# Patient Record
Sex: MALE | Race: BLACK OR AFRICAN AMERICAN | Employment: FULL TIME | URBAN - METROPOLITAN AREA
[De-identification: names, ages, dates, MRNs, and addresses within clinical notes are randomized per-mention and may not be internally consistent; named-entity substitution may affect disease eponyms.]

---
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
## 2020-03-09 ENCOUNTER — ED HISTORICAL/CONVERTED ENCOUNTER (OUTPATIENT)
Dept: OTHER | Age: 20
End: 2020-03-09

## 2020-06-03 ENCOUNTER — ED HISTORICAL/CONVERTED ENCOUNTER (OUTPATIENT)
Dept: OTHER | Age: 20
End: 2020-06-03

## 2020-10-01 ENCOUNTER — APPOINTMENT (OUTPATIENT)
Dept: ULTRASOUND IMAGING | Age: 20
End: 2020-10-01
Attending: PHYSICIAN ASSISTANT
Payer: MEDICAID

## 2020-10-01 ENCOUNTER — HOSPITAL ENCOUNTER (EMERGENCY)
Age: 20
Discharge: HOME | End: 2020-10-01
Payer: MEDICAID

## 2020-10-01 VITALS
OXYGEN SATURATION: 97 % | HEIGHT: 71 IN | TEMPERATURE: 98.9 F | DIASTOLIC BLOOD PRESSURE: 75 MMHG | SYSTOLIC BLOOD PRESSURE: 122 MMHG | WEIGHT: 260 LBS | HEART RATE: 66 BPM | BODY MASS INDEX: 36.4 KG/M2 | RESPIRATION RATE: 18 BRPM

## 2020-10-01 DIAGNOSIS — S39.011A STRAIN OF ABDOMINAL MUSCLE, INITIAL ENCOUNTER: Primary | ICD-10-CM

## 2020-10-01 LAB
APPEARANCE UR: CLEAR
BACTERIA URNS QL MICRO: NEGATIVE /HPF
BILIRUB UR QL: NEGATIVE
COLOR UR: ABNORMAL
GLUCOSE UR STRIP.AUTO-MCNC: NEGATIVE MG/DL
HGB UR QL STRIP: NEGATIVE
KETONES UR QL STRIP.AUTO: NEGATIVE MG/DL
LEUKOCYTE ESTERASE UR QL STRIP.AUTO: NEGATIVE
MUCOUS THREADS URNS QL MICRO: ABNORMAL /LPF
NITRITE UR QL STRIP.AUTO: NEGATIVE
PH UR STRIP: 6 [PH] (ref 5–8)
PROT UR STRIP-MCNC: NEGATIVE MG/DL
RBC #/AREA URNS HPF: ABNORMAL /HPF (ref 0–5)
SP GR UR REFRACTOMETRY: 1.03 (ref 1–1.03)
UROBILINOGEN UR QL STRIP.AUTO: 2 EU/DL (ref 0.1–1)
WBC URNS QL MICRO: ABNORMAL /HPF (ref 0–4)

## 2020-10-01 PROCEDURE — 76870 US EXAM SCROTUM: CPT

## 2020-10-01 PROCEDURE — 99283 EMERGENCY DEPT VISIT LOW MDM: CPT

## 2020-10-01 PROCEDURE — 81001 URINALYSIS AUTO W/SCOPE: CPT

## 2020-10-01 RX ORDER — CYCLOBENZAPRINE HCL 5 MG
5 TABLET ORAL
Qty: 15 TAB | Refills: 0 | Status: SHIPPED | OUTPATIENT
Start: 2020-10-01 | End: 2020-10-06

## 2020-10-01 RX ORDER — NAPROXEN 500 MG/1
500 TABLET ORAL 2 TIMES DAILY WITH MEALS
Qty: 20 TAB | Refills: 0 | Status: SHIPPED | OUTPATIENT
Start: 2020-10-01 | End: 2020-10-11

## 2020-10-01 NOTE — LETTER
NOTIFICATION RETURN TO WORK / SCHOOL 
 
10/1/2020 12:07 PM 
 
Mr. Estela Reyna [address] [facility] [id number] To Whom It May Concern: 
 
Estela Reyna is currently under the care of [facility] EMERGENCY DEPT. He will return to work/school on: 10/3/2020 Estela Reyna may return to work/school with the following restrictions: {Work activity restriction:77899}. If there are questions or concerns please have the patient contact our office. Sincerely, Yang Anderson PA-C

## 2020-10-01 NOTE — LETTER
Eileen Ward was seen and treated in our emergency department on 10/1/2020. He may return to work on 10/3/2020. If you have any questions or concerns, please don't hesitate to call.  
 
 
Navid oPnce PA-C

## 2020-10-01 NOTE — ED PROVIDER NOTES
EMERGENCY DEPARTMENT HISTORY AND PHYSICAL EXAM      Date: 10/1/2020  Patient Name: Devin Pinto    History of Presenting Illness     Chief Complaint   Patient presents with    Abdominal Pain       History Provided By: Patient    HPI: Devin Pinto, 21 y.o. male with No significant past medical history presents to the ED with cc of intermittent, sharp, 7/10 lower abdominal pain radiating down towards his scrotal area x4 days. Symptoms exacerbated with certain movements. No particular alleviating factors as pain just resolves on its own. He began going to the gym and doing more ab workouts recently but wanted to make sure there there was no  abnormality. Patient does not take any medication on a daily basis. Denies tobacco, call, illicit drug use. Patient denies fever, chills, chest pain, shortness of breath, nausea, vomiting, diarrhea, urinary symptoms. No concern for STI at this time. There are no other complaints, changes, or physical findings at this time. PCP: No primary care provider on file. No current facility-administered medications on file prior to encounter. No current outpatient medications on file prior to encounter. Past History     Past Medical History:  No past medical history on file. Past Surgical History:  No past surgical history on file. Family History:  No family history on file. Social History:  Social History     Tobacco Use    Smoking status: Not on file   Substance Use Topics    Alcohol use: Not on file    Drug use: Not on file       Allergies:  No Known Allergies      Review of Systems     Review of Systems   Constitutional: Negative for appetite change, chills, diaphoresis, fatigue, fever and unexpected weight change. HENT: Negative. Respiratory: Negative for shortness of breath. Cardiovascular: Negative for chest pain. Gastrointestinal: Positive for abdominal pain (lower).  Negative for abdominal distention, anal bleeding, blood in stool, constipation, diarrhea, nausea and vomiting. Denies hematemesis   Genitourinary: Positive for testicular pain. Negative for discharge, flank pain, frequency, hematuria, penile pain, penile swelling, scrotal swelling and urgency. Musculoskeletal: Negative for back pain. Skin: Negative for rash. Neurological: Negative for dizziness, weakness and light-headedness. Psychiatric/Behavioral: Negative. All other systems reviewed and are negative. Physical Exam     Physical Exam  Vitals signs and nursing note reviewed. Constitutional:       General: He is not in acute distress. Appearance: Normal appearance. He is well-developed. He is not ill-appearing, toxic-appearing or diaphoretic. Comments: AA male sitting in chair, able to transfer from chair to stretcher without pain   HENT:      Head: Normocephalic and atraumatic. Nose: Nose normal. No congestion or rhinorrhea. Mouth/Throat:      Mouth: Mucous membranes are moist.      Pharynx: Oropharynx is clear. No oropharyngeal exudate or posterior oropharyngeal erythema. Eyes:      General: No scleral icterus. Conjunctiva/sclera: Conjunctivae normal.      Pupils: Pupils are equal, round, and reactive to light. Neck:      Musculoskeletal: Normal range of motion and neck supple. No neck rigidity or muscular tenderness. Cardiovascular:      Rate and Rhythm: Normal rate and regular rhythm. Pulses:           Radial pulses are 2+ on the right side and 2+ on the left side. Dorsalis pedis pulses are 2+ on the right side and 2+ on the left side. Heart sounds: No murmur. No friction rub. No gallop. Pulmonary:      Effort: Pulmonary effort is normal. No tachypnea, accessory muscle usage, respiratory distress or retractions. Breath sounds: Normal breath sounds. No stridor. No decreased breath sounds, wheezing, rhonchi or rales. Chest:      Chest wall: No tenderness.    Abdominal:      General: Bowel sounds are normal. There is no distension. Palpations: Abdomen is soft. There is no mass. Tenderness: There is no abdominal tenderness. There is no right CVA tenderness, left CVA tenderness, guarding or rebound. Musculoskeletal: Normal range of motion. General: No deformity. Right lower leg: No edema. Left lower leg: No edema. Skin:     General: Skin is warm and dry. Capillary Refill: Capillary refill takes less than 2 seconds. Coloration: Skin is not jaundiced or pale. Findings: No bruising, erythema or rash. Neurological:      General: No focal deficit present. Mental Status: He is alert and oriented to person, place, and time. Mental status is at baseline. Sensory: Sensation is intact. Motor: Motor function is intact. Psychiatric:         Mood and Affect: Mood normal.         Behavior: Behavior normal. Behavior is cooperative. Thought Content: Thought content normal.         Judgment: Judgment normal.         Diagnostic Study Results     Labs -     Recent Results (from the past 12 hour(s))   URINALYSIS W/MICROSCOPIC    Collection Time: 10/01/20  9:25 AM   Result Value Ref Range    Color Yellow/Straw      Appearance Clear Clear      Specific gravity 1.028 1.003 - 1.030      pH (UA) 6.0 5.0 - 8.0      Protein Negative Negative mg/dL    Glucose Negative Negative mg/dL    Ketone Negative Negative mg/dL    Bilirubin Negative Negative      Blood Negative Negative      Urobilinogen 2.0 (H) 0.1 - 1.0 EU/dL    Nitrites Negative Negative      Leukocyte Esterase Negative Negative      WBC 0-4 0 - 4 /hpf    RBC 0-5 0 - 5 /hpf    Bacteria Negative Negative /hpf    Mucus 3+ /lpf       Radiologic Studies -   Study Result     Testicular ultrasound, 10/1/2020     History: Scrotal, suprapubic discomfort.     Comparison: None.     Findings: The right testicle measures 3.2 cm x 4.4 cm x 2.0 cm. The right  testicle has normal echotexture.   No mass is identified. The right epididymal  head measures 0.8 cm and has no focal abnormality.       The left testicle measures 2.9 cm x 3.7 cm x 1.7 cm. The left testicle has  normal echotexture. No mass is identified. The left epididymal head measures  0.6 and has no focal abnormality.     Color flow imaging shows blood flow in both testicles.       There is no gross evidence of inguinal hernia.     Few inguinal lymph nodes with normal morphology measuring up to 0.6 cm x 0.8 cm  x 1.2 cm are noted.     IMPRESSION  Impression: No testicular mass or acute process identified.         Imaging     US SCROTUM/TESTICLES (Order: [id number]) - 10/1/2020       Medical Decision Making   I am the first provider for this patient. I reviewed the vital signs, available nursing notes, past medical history, past surgical history, family history and social history. Vital Signs-Reviewed the patient's vital signs. Patient Vitals for the past 12 hrs:   Temp Pulse Resp BP SpO2   10/01/20 0909 98.9 °F (37.2 °C) 66 18 122/75 97 %       Records Reviewed: Nursing Notes and Old Medical Records    The patient presents with abdominal pain with a differential diagnosis of muscle strain, torsion, UTI      Provider Notes (Medical Decision Making):     MDM  Number of Diagnoses or Management Options  Diagnosis management comments:     26-year-old with lower abdominal discomfort radiating to bilateral testes. History of increase in exercise regimen but concerned about possible  component. UA unremarkable. Ultrasound of the scrotum/testes was also unremarkable. Will treat patient with anti-inflammatory and muscle relaxer for symptomatic relief. Amount and/or Complexity of Data Reviewed  Clinical lab tests: ordered and reviewed  Tests in the radiology section of CPT®: ordered and reviewed  Review and summarize past medical records: yes    Patient Progress  Patient progress: stable           ED Course:   Initial assessment performed.  The patients presenting problems have been discussed, and they are in agreement with the care plan formulated and outlined with them. I have encouraged them to ask questions as they arise throughout their visit. PLAN:  1. Current Discharge Medication List      START taking these medications    Details   naproxen (Naprosyn) 500 mg tablet Take 1 Tab by mouth two (2) times daily (with meals) for 10 days. Qty: 20 Tab, Refills: 0      cyclobenzaprine (FLEXERIL) 5 mg tablet Take 1 Tab by mouth three (3) times daily as needed for Muscle Spasm(s) for up to 5 days. Qty: 15 Tab, Refills: 0           2. Follow-up Information     Follow up With Specialties Details Why [address] [facility] EMERGENCY DEPT Emergency Medicine  As needed, If symptoms worsen [address] [facility] [address] [phone]    Your Primary Care Provider  In 3 days As needed         Return to ED if worse     Diagnosis     Clinical Impression:   1.  Strain of abdominal muscle, initial encounter

## 2021-01-26 ENCOUNTER — HOSPITAL ENCOUNTER (EMERGENCY)
Age: 21
Discharge: LEFT BEFORE DISCHARGE | End: 2021-01-26
Attending: EMERGENCY MEDICINE
Payer: MEDICAID

## 2021-01-26 VITALS
WEIGHT: 260 LBS | DIASTOLIC BLOOD PRESSURE: 72 MMHG | HEART RATE: 67 BPM | BODY MASS INDEX: 36.4 KG/M2 | SYSTOLIC BLOOD PRESSURE: 124 MMHG | HEIGHT: 71 IN | OXYGEN SATURATION: 100 % | RESPIRATION RATE: 16 BRPM

## 2021-01-26 PROCEDURE — 75810000275 HC EMERGENCY DEPT VISIT NO LEVEL OF CARE

## 2021-01-27 ENCOUNTER — HOSPITAL ENCOUNTER (EMERGENCY)
Age: 21
Discharge: HOME | End: 2021-01-27
Attending: FAMILY MEDICINE
Payer: MEDICAID

## 2021-01-27 ENCOUNTER — APPOINTMENT (OUTPATIENT)
Dept: CT IMAGING | Age: 21
End: 2021-01-27
Attending: FAMILY MEDICINE
Payer: MEDICAID

## 2021-01-27 VITALS
TEMPERATURE: 98 F | HEART RATE: 80 BPM | BODY MASS INDEX: 36.4 KG/M2 | OXYGEN SATURATION: 99 % | WEIGHT: 260 LBS | RESPIRATION RATE: 20 BRPM | HEIGHT: 71 IN | SYSTOLIC BLOOD PRESSURE: 122 MMHG | DIASTOLIC BLOOD PRESSURE: 80 MMHG

## 2021-01-27 DIAGNOSIS — I88.0 MESENTERIC ADENITIS: Primary | ICD-10-CM

## 2021-01-27 LAB
ALBUMIN SERPL-MCNC: 4 G/DL (ref 3.5–5)
ALBUMIN/GLOB SERPL: 1 {RATIO} (ref 1.1–2.2)
ALP SERPL-CCNC: 68 U/L (ref 45–117)
ALT SERPL-CCNC: 32 U/L (ref 12–78)
ANION GAP SERPL CALC-SCNC: 9 MMOL/L (ref 5–15)
APPEARANCE UR: CLEAR
AST SERPL W P-5'-P-CCNC: 26 U/L (ref 15–37)
BASOPHILS # BLD: 0 K/UL (ref 0–0.1)
BASOPHILS NFR BLD: 0 % (ref 0–1)
BILIRUB SERPL-MCNC: 0.3 MG/DL (ref 0.2–1)
BILIRUB UR QL: NEGATIVE
BUN SERPL-MCNC: 9 MG/DL (ref 6–20)
BUN/CREAT SERPL: 11 (ref 12–20)
CA-I BLD-MCNC: 9.9 MG/DL (ref 8.5–10.1)
CHLORIDE SERPL-SCNC: 104 MMOL/L (ref 97–108)
CO2 SERPL-SCNC: 28 MMOL/L (ref 21–32)
COLOR UR: YELLOW
CREAT SERPL-MCNC: 0.79 MG/DL (ref 0.7–1.3)
DIFFERENTIAL METHOD BLD: NORMAL
EOSINOPHIL # BLD: 0.1 K/UL (ref 0–0.4)
EOSINOPHIL NFR BLD: 1 % (ref 0–7)
ERYTHROCYTE [DISTWIDTH] IN BLOOD BY AUTOMATED COUNT: 12.2 % (ref 11.5–14.5)
GLOBULIN SER CALC-MCNC: 4.1 G/DL (ref 2–4)
GLUCOSE SERPL-MCNC: 97 MG/DL (ref 65–100)
GLUCOSE UR STRIP.AUTO-MCNC: NEGATIVE MG/DL
HCT VFR BLD AUTO: 42.8 % (ref 36.6–50.3)
HGB BLD-MCNC: 14.1 G/DL (ref 12.1–17)
HGB UR QL STRIP: NEGATIVE
IMM GRANULOCYTES # BLD AUTO: 0 K/UL (ref 0–0.04)
IMM GRANULOCYTES NFR BLD AUTO: 0 % (ref 0–0.5)
KETONES UR QL STRIP.AUTO: NEGATIVE MG/DL
LACTATE SERPL-SCNC: 1.2 MMOL/L (ref 0.4–2)
LEUKOCYTE ESTERASE UR QL STRIP.AUTO: NEGATIVE
LIPASE SERPL-CCNC: 52 U/L (ref 73–393)
LYMPHOCYTES # BLD: 2.5 K/UL (ref 0.8–3.5)
LYMPHOCYTES NFR BLD: 33 % (ref 12–49)
MCH RBC QN AUTO: 31 PG (ref 26–34)
MCHC RBC AUTO-ENTMCNC: 32.9 G/DL (ref 30–36.5)
MCV RBC AUTO: 94.1 FL (ref 80–99)
MONOCYTES # BLD: 0.6 K/UL (ref 0–1)
MONOCYTES NFR BLD: 8 % (ref 5–13)
NEUTS SEG # BLD: 4.5 K/UL (ref 1.8–8)
NEUTS SEG NFR BLD: 58 % (ref 32–75)
NITRITE UR QL STRIP.AUTO: NEGATIVE
PH UR STRIP: 7 [PH] (ref 5–8)
PLATELET # BLD AUTO: 215 K/UL (ref 150–400)
PMV BLD AUTO: 11.9 FL (ref 8.9–12.9)
POTASSIUM SERPL-SCNC: 3.8 MMOL/L (ref 3.5–5.1)
PROT SERPL-MCNC: 8.1 G/DL (ref 6.4–8.2)
PROT UR STRIP-MCNC: NEGATIVE MG/DL
RBC # BLD AUTO: 4.55 M/UL (ref 4.1–5.7)
SODIUM SERPL-SCNC: 141 MMOL/L (ref 136–145)
SP GR UR REFRACTOMETRY: 1.01 (ref 1–1.03)
UROBILINOGEN UR QL STRIP.AUTO: 0.1 EU/DL (ref 0.2–1)
WBC # BLD AUTO: 7.8 K/UL (ref 4.1–11.1)

## 2021-01-27 PROCEDURE — 83690 ASSAY OF LIPASE: CPT

## 2021-01-27 PROCEDURE — 36415 COLL VENOUS BLD VENIPUNCTURE: CPT

## 2021-01-27 PROCEDURE — 81003 URINALYSIS AUTO W/O SCOPE: CPT

## 2021-01-27 PROCEDURE — 99283 EMERGENCY DEPT VISIT LOW MDM: CPT

## 2021-01-27 PROCEDURE — 80053 COMPREHEN METABOLIC PANEL: CPT

## 2021-01-27 PROCEDURE — 74011000636 HC RX REV CODE- 636: Performed by: FAMILY MEDICINE

## 2021-01-27 PROCEDURE — 74177 CT ABD & PELVIS W/CONTRAST: CPT

## 2021-01-27 PROCEDURE — 85025 COMPLETE CBC W/AUTO DIFF WBC: CPT

## 2021-01-27 PROCEDURE — 83605 ASSAY OF LACTIC ACID: CPT

## 2021-01-27 RX ADMIN — IOPAMIDOL 100 ML: 755 INJECTION, SOLUTION INTRAVENOUS at 12:14

## 2021-01-27 NOTE — Clinical Note
[address] [place] [address] 
[phone] Work/School Note Date: 1/27/2021 To Whom It May concern: 
 
 
Kimmie Jenkins was seen and treated today in the emergency room by the following provider(s): 
Attending Provider: Mabel Felty, DO. Kimmie Jenkins is excused from work/school on 01/27/21. He is clear to return to work/school on 01/28/21. Sincerely, Alok Segovia DO

## 2021-01-27 NOTE — ED PROVIDER NOTES
EMERGENCY DEPARTMENT HISTORY AND PHYSICAL EXAM      Date: 1/27/2021  Patient Name: Paz Hylton    History of Presenting Illness     Chief Complaint   Patient presents with    Diarrhea    Nausea    Abdominal Pain       History Provided By:     HPI: Paz Hylton, is an extremely pleasant 21 y.o. male presenting to the ED with cc of abdominal pain. He states is been going on for several days. Does not seem to be getting better nor worse. It comes and goes. He is also been feeling nauseous and achy. He has never had nothing like this before. He had to call in from work because he did not feel like he could perform his duties. He has been nauseous but has not vomited. No fever nor chills. Decreased appetite but drinking well. No bloody nor black stools, but mild diarrhea. He denies sick contacts no recent travel. There are no other complaints, changes, or physical findings at this time. PCP: Beto Browne MD    No current facility-administered medications on file prior to encounter. No current outpatient medications on file prior to encounter. Past History     Past Medical History:  History reviewed. No pertinent past medical history. Past Surgical History:  History reviewed. No pertinent surgical history. Family History:  History reviewed. No pertinent family history. Social History:  Social History     Tobacco Use    Smoking status: Never Smoker    Smokeless tobacco: Never Used   Substance Use Topics    Alcohol use: Never     Frequency: Never    Drug use: Never       Allergies:  No Known Allergies      Review of Systems     Review of Systems   Constitutional: Positive for activity change and appetite change. Negative for chills, fatigue and fever. HENT: Negative for congestion and sore throat. Eyes: Negative for photophobia and visual disturbance. Respiratory: Negative for cough, shortness of breath and wheezing.     Cardiovascular: Negative for chest pain, palpitations and leg swelling. Gastrointestinal: Positive for abdominal pain, diarrhea and nausea. Negative for vomiting. Endocrine: Negative for cold intolerance and heat intolerance. Genitourinary: Positive for dysuria and frequency. Musculoskeletal: Negative for gait problem and joint swelling. Skin: Negative for color change and rash. Neurological: Negative for dizziness and headaches. Physical Exam     Physical Exam  Constitutional:       Appearance: Normal appearance. HENT:      Head: Normocephalic and atraumatic. Mouth/Throat:      Mouth: Mucous membranes are moist.      Pharynx: Oropharynx is clear. Eyes:      Extraocular Movements: Extraocular movements intact. Conjunctiva/sclera: Conjunctivae normal.   Neck:      Musculoskeletal: Normal range of motion and neck supple. Cardiovascular:      Rate and Rhythm: Normal rate and regular rhythm. Heart sounds: Normal heart sounds. Pulmonary:      Effort: Pulmonary effort is normal. No respiratory distress. Breath sounds: Normal breath sounds. No wheezing, rhonchi or rales. Abdominal:      General: There is no distension. Palpations: Abdomen is soft. Tenderness: There is no abdominal tenderness. There is no guarding. Comments: Mild right lower quadrant abdominal pain      No rebound, guarding. No right upper quadrant tenderness. Musculoskeletal:         General: No deformity. Left lower leg: No edema. Skin:     Findings: No erythema or rash. Neurological:      General: No focal deficit present. Mental Status: He is alert and oriented to person, place, and time.       Gait: Gait normal.   Psychiatric:         Mood and Affect: Mood normal.         Behavior: Behavior normal.         Lab and Diagnostic Study Results     Labs -     Recent Results (from the past 12 hour(s))   CBC WITH AUTOMATED DIFF    Collection Time: 01/27/21 10:15 AM   Result Value Ref Range    WBC 7.8 4.1 - 11.1 K/uL    RBC 4. 55 4.10 - 5.70 M/uL    HGB 14.1 12.1 - 17.0 g/dL    HCT 42.8 36.6 - 50.3 %    MCV 94.1 80.0 - 99.0 FL    MCH 31.0 26.0 - 34.0 PG    MCHC 32.9 30.0 - 36.5 g/dL    RDW 12.2 11.5 - 14.5 %    PLATELET 060 817 - 046 K/uL    MPV 11.9 8.9 - 12.9 FL    NEUTROPHILS 58 32 - 75 %    LYMPHOCYTES 33 12 - 49 %    MONOCYTES 8 5 - 13 %    EOSINOPHILS 1 0 - 7 %    BASOPHILS 0 0 - 1 %    IMMATURE GRANULOCYTES 0 0.0 - 0.5 %    ABS. NEUTROPHILS 4.5 1.8 - 8.0 K/UL    ABS. LYMPHOCYTES 2.5 0.8 - 3.5 K/UL    ABS. MONOCYTES 0.6 0.0 - 1.0 K/UL    ABS. EOSINOPHILS 0.1 0.0 - 0.4 K/UL    ABS. BASOPHILS 0.0 0.0 - 0.1 K/UL    ABS. IMM. GRANS. 0.0 0.00 - 0.04 K/UL    DF AUTOMATED     METABOLIC PANEL, COMPREHENSIVE    Collection Time: 01/27/21 10:15 AM   Result Value Ref Range    Sodium 141 136 - 145 mmol/L    Potassium 3.8 3.5 - 5.1 mmol/L    Chloride 104 97 - 108 mmol/L    CO2 28 21 - 32 mmol/L    Anion gap 9 5 - 15 mmol/L    Glucose 97 65 - 100 mg/dL    BUN 9 6 - 20 mg/dL    Creatinine 0.79 0.70 - 1.30 mg/dL    BUN/Creatinine ratio 11 (L) 12 - 20      GFR est AA >60 >60 ml/min/1.73m2    GFR est non-AA >60 >60 ml/min/1.73m2    Calcium 9.9 8.5 - 10.1 mg/dL    Bilirubin, total 0.3 0.2 - 1.0 mg/dL    AST (SGOT) 26 15 - 37 U/L    ALT (SGPT) 32 12 - 78 U/L    Alk.  phosphatase 68 45 - 117 U/L    Protein, total 8.1 6.4 - 8.2 g/dL    Albumin 4.0 3.5 - 5.0 g/dL    Globulin 4.1 (H) 2.0 - 4.0 g/dL    A-G Ratio 1.0 (L) 1.1 - 2.2     LIPASE    Collection Time: 01/27/21 10:15 AM   Result Value Ref Range    Lipase 52 (L) 73 - 393 U/L   LACTIC ACID    Collection Time: 01/27/21 10:15 AM   Result Value Ref Range    Lactic acid 1.2 0.4 - 2.0 mmol/L   URINALYSIS W/ RFLX MICROSCOPIC    Collection Time: 01/27/21 10:15 AM   Result Value Ref Range    Color Yellow      Appearance Clear Clear      Specific gravity 1.010 1.003 - 1.030      pH (UA) 7.0 5.0 - 8.0      Protein Negative Negative mg/dL    Glucose Negative Negative mg/dL    Ketone Negative Negative mg/dL Bilirubin Negative Negative      Blood Negative Negative      Urobilinogen 0.1 (L) 0.2 - 1.0 EU/dL    Nitrites Negative Negative      Leukocyte Esterase Negative Negative         Radiologic Studies -   @lastxrresult@  CT Results  (Last 48 hours)               01/27/21 1216  CT ABD PELV W CONT Final result    Impression:  Impression: Few relatively prominent mesenteric lymph nodes in the right lower   quadrant, nonspecific. Mesenteric adenitis is a consideration. Other findings   as above. Narrative:  CT abdomen and pelvis, 1/27/2021       History: Right lower quadrant pain. Nausea. Technique: Oral contrast was not given. Multiple contiguous axial images were   obtained from the diaphragm to the inferior pubic rami following intravenous   administration of 100 mL Isovue 370 contrast material.  Coronal and sagittal   reconstruction of images was made. All CT scans at this facility are performed using dose reduction optimization   techniques as appropriate to perform the exam including the following: Automated   exposure control, adjustments of the mA and/or kV according to patient size, or   use iterative reconstruction technique. Comparison:  None. Findings: The included lung bases are clear. The liver, gallbladder, spleen, pancreas, adrenal glands and kidneys demonstrate   no focal abnormalities. There is a small fat-containing umbilical hernia measuring 2.7 cm in greatest   dimension. The stomach has grossly normal contours. There is no evidence of mechanical   bowel obstruction. The terminal ileum is within normal limits. The appendix   images normally. There is no specific CT evidence of colitis. There are a few relatively prominent mesenteric lymph nodes in the right lower   quadrant measuring up to 7 mm in short axis. These are nonspecific in etiology. Mesenteric adenitis is a consideration. The bladder is partially distended.   No focal bladder wall thickening or   intraluminal calculus is seen. The prostate gland and seminal vesicles are   within normal limits. No inguinal hernia is identified. The abdominal aorta is normal in caliber. No free air or fluid is noted. The lumbar spine and bony pelvis are intact. CXR Results  (Last 48 hours)    None            Medical Decision Making   - I am the first provider for this patient. - I reviewed the vital signs, available nursing notes, past medical history, past surgical history, family history and social history. - Initial assessment performed. The patients presenting problems have been discussed, and they are in agreement with the care plan formulated and outlined with them. I have encouraged them to ask questions as they arise throughout their visit. Vital Signs-Reviewed the patient's vital signs. Patient Vitals for the past 12 hrs:   Temp Pulse Resp BP SpO2   01/27/21 1155 -- 80 20 122/80 99 %   01/27/21 0932 98 °F (36.7 °C) 75 18 129/84 98 %         ED Course/ Provider Notes (Medical Decision Making):     Patient presented to the emergency with a chief complaint of abdominal pain, nausea and diarrhea. Vital signs unremarkable per above. CBC, CMP, lipase and lactic acid all unremarkable. CT scan with IV contrast demonstrated: Impression: Few relatively prominent mesenteric lymph nodes in the right lower  quadrant, nonspecific. Mesenteric adenitis is a consideration. Other findings  as above. Patient did not require any medications for his symptoms as he was improving. History and exam findings consistent with the CT findings demonstrating possible mesenteric adenitis. We discussed this diagnosis as well as supportive measures to take    Froylan Mora was given a thorough list of signs and symptoms that would warrant an immediate return to the emergency department. Otherwise Froylan Mora will follow up with PCP.        Procedures   Medical Decision Makingedical Decision Making  Performed by: Sonal Ball DO  Procedures  None       Disposition   Disposition:     All of the diagnostic tests were reviewed and questions answered. Diagnosis, care plan and treatment options were discussed. The patient understands the instructions and will follow up as directed. The patients results have been reviewed with them. They have been counseled regarding their diagnosis. The patient verbally convey understanding and agreement of the signs, symptoms, diagnosis, treatment and prognosis and additionally agrees to follow up as recommended with their PCP in 24 - 48 hours. They also agree with the care-plan and convey that all of their questions have been answered. I have also put together some discharge instructions for them that include: 1) educational information regarding their diagnosis, 2) how to care for their diagnosis at home, as well a 3) list of reasons why they would want to return to the ED prior to their follow-up appointment, should their condition change. Home     DISCHARGE PLAN:    1. There are no discharge medications for this patient. 2.   Follow-up Information     Follow up With Specialties Details Why Contact Info    Franny Glover MD Pediatric Medicine Schedule an appointment as soon as possible for a visit in 1 day  [address]      Franny Glover MD Pediatric Medicine Schedule an appointment as soon as possible for a visit   [address]              3.  Return to ED if worse       4. There are no discharge medications for this patient. Diagnosis     Clinical Impression:    1. Mesenteric adenitis        Attestations:    Sonal Ball DO    Please note that this dictation was completed with [email or web address], the computer voice recognition software.   Quite often unanticipated grammatical, syntax, homophones, and other interpretive errors are inadvertently transcribed by the computer software. Please disregard these errors. Please excuse any errors that have escaped final proofreading. Thank you.

## 2021-01-27 NOTE — LETTER
[address] [address] [place] [address] 
[phone] Work/School Note Date: 1/27/2021 To Whom It May concern: 
 
Brad Nina was seen and treated today in the emergency room by the following provider(s): 
Attending Provider: Xavier Banegas DO. Brad Nina  May return 1/31/2021 Sincerely, Rubina Mujica RN

## 2021-01-27 NOTE — ED TRIAGE NOTES
\" I ate some hamburgers and stuff from [employer] night and about 15 minutes after I started with some nausea, fatigue, and abdominal cramping and then I started having diarrhea about 3 hours later \"

## 2021-02-03 ENCOUNTER — HOSPITAL ENCOUNTER (EMERGENCY)
Age: 21
Discharge: HOME | End: 2021-02-03
Attending: FAMILY MEDICINE
Payer: MEDICAID

## 2021-02-03 VITALS
WEIGHT: 260 LBS | TEMPERATURE: 99.1 F | RESPIRATION RATE: 16 BRPM | HEIGHT: 71 IN | DIASTOLIC BLOOD PRESSURE: 82 MMHG | BODY MASS INDEX: 36.4 KG/M2 | SYSTOLIC BLOOD PRESSURE: 128 MMHG | HEART RATE: 86 BPM

## 2021-02-03 DIAGNOSIS — F41.0 PANIC ATTACKS: Primary | ICD-10-CM

## 2021-02-03 PROCEDURE — 99282 EMERGENCY DEPT VISIT SF MDM: CPT

## 2021-02-03 RX ORDER — HYDROXYZINE 50 MG/1
50 TABLET, FILM COATED ORAL
Qty: 30 TAB | Refills: 0 | Status: SHIPPED | OUTPATIENT
Start: 2021-02-03 | End: 2021-02-13

## 2021-02-03 NOTE — LETTER
[address] [place] [address] 
[phone] Work/School Note Date: 2/3/2021 To Whom It May concern: 
 
Kimmie Jenkins was seen and treated today in the emergency room by the following provider(s): 
Attending Provider: Mabel Felty, DO. Kimmie Jenkins may return to work 02/04/2021 Dr. Nav Temple

## 2021-02-03 NOTE — ED PROVIDER NOTES
EMERGENCY DEPARTMENT HISTORY AND PHYSICAL EXAM      Date: 2/3/2021  Patient Name: Eva Lopez    History of Presenting Illness     Chief Complaint   Patient presents with    Anxiety       History Provided By:     HPI: Eva Lopez, is an extremely pleasant 21 y.o. male presenting to the ED with a chief complaint of anxiety. He states this has been a challenge for him for a very long time. He states most recently he has been having panic attacks. He states he was recently in a cab when he started to have rapid breathing, tingling in his extremities and profound anxiety. He ended up calling 911. He was reassured after being examined and did not go to the hospital.  He states he is concerned because he is not sure when his next panic attack will happen. He states he currently made an appointment with a new PCP however his appointment is not until May. Aside from the rapid breathing, tingling in his extremities and profound anxiety he denies any other concerns. There is been no chest pain. No nausea vomiting or diarrhea. He is eating and drinking well. He denies other concerns. There are no other complaints, changes, or physical findings at this time. PCP: Dale Seymour MD    No current facility-administered medications on file prior to encounter. No current outpatient medications on file prior to encounter. Past History     Past Medical History:  History reviewed. No pertinent past medical history. Past Surgical History:  History reviewed. No pertinent surgical history. Family History:  History reviewed. No pertinent family history.     Social History:  Social History     Tobacco Use    Smoking status: Never Smoker    Smokeless tobacco: Never Used   Substance Use Topics    Alcohol use: Never     Frequency: Never    Drug use: Never       Allergies:  No Known Allergies      Review of Systems     Review of Systems   Constitutional: Negative for activity change, appetite change, chills, fatigue and fever. HENT: Negative for congestion and sore throat. Eyes: Negative for photophobia and visual disturbance. Respiratory: Negative for cough, shortness of breath and wheezing. Cardiovascular: Negative for chest pain, palpitations and leg swelling. Gastrointestinal: Negative for abdominal pain, diarrhea, nausea and vomiting. Endocrine: Negative for cold intolerance and heat intolerance. Musculoskeletal: Negative for gait problem and joint swelling. Skin: Negative for color change and rash. Neurological: Negative for dizziness and headaches. Psychiatric/Behavioral:        Anxiety, denies suicidal ideation       Physical Exam     Physical Exam  Constitutional:       Appearance: Normal appearance. HENT:      Head: Normocephalic and atraumatic. Mouth/Throat:      Mouth: Mucous membranes are moist.      Pharynx: Oropharynx is clear. Eyes:      Extraocular Movements: Extraocular movements intact. Conjunctiva/sclera: Conjunctivae normal.   Neck:      Musculoskeletal: Normal range of motion and neck supple. Cardiovascular:      Rate and Rhythm: Normal rate and regular rhythm. Heart sounds: Normal heart sounds. Pulmonary:      Effort: Pulmonary effort is normal. No respiratory distress. Breath sounds: Normal breath sounds. No wheezing, rhonchi or rales. Abdominal:      General: There is no distension. Palpations: Abdomen is soft. Tenderness: There is no abdominal tenderness. There is no guarding. Musculoskeletal:         General: No deformity. Right lower leg: No edema. Left lower leg: No edema. Skin:     Findings: No erythema or rash. Neurological:      General: No focal deficit present. Mental Status: He is alert and oriented to person, place, and time. Gait: Gait normal.   Psychiatric:         Attention and Perception: Attention and perception normal.         Mood and Affect: Mood is anxious.          Speech: Speech normal.         Behavior: Behavior normal. Behavior is cooperative. Thought Content: Thought content normal.         Cognition and Memory: Cognition normal.         Judgment: Judgment normal.      Comments: Incredibly pleasant         Lab and Diagnostic Study Results     Labs -   No results found for this or any previous visit (from the past 12 hour(s)). Radiologic Studies -   @lastxrresult@  CT Results  (Last 48 hours)    None        CXR Results  (Last 48 hours)    None            Medical Decision Making   - I am the first provider for this patient. - I reviewed the vital signs, available nursing notes, past medical history, past surgical history, family history and social history. - Initial assessment performed. The patients presenting problems have been discussed, and they are in agreement with the care plan formulated and outlined with them. I have encouraged them to ask questions as they arise throughout their visit. Vital Signs-Reviewed the patient's vital signs. No data found. ED Course/ Provider Notes (Medical Decision Making):     Patient presented to the emergency department with a chief complaint of anxiety and panic attacks. We had a very long discussion regarding managing panic attacks. I prescribed hydroxyzine and gave him a handout on panic attacks. He is going to follow-up with his PCP. Rajan Paredes was given a thorough list of signs and symptoms that would warrant an immediate return to the emergency department. Otherwise Rajan Paredes will follow up with PCP. Procedures   Medical Decision Makingedical Decision Making  Performed by: April Aguirre DO  Procedures  None       Disposition   Disposition:     Home    All of the diagnostic tests were reviewed and questions answered. Diagnosis, care plan and treatment options were discussed. The patient understands the instructions and will follow up as directed.  The patients results have been reviewed with them. They have been counseled regarding their diagnosis. The patient verbally convey understanding and agreement of the signs, symptoms, diagnosis, treatment and prognosis and additionally agrees to follow up as recommended with their PCP in 24 - 48 hours. They also agree with the care-plan and convey that all of their questions have been answered. I have also put together some discharge instructions for them that include: 1) educational information regarding their diagnosis, 2) how to care for their diagnosis at home, as well a 3) list of reasons why they would want to return to the ED prior to their follow-up appointment, should their condition change. DISCHARGE PLAN:    1. Current Discharge Medication List      START taking these medications    Details   hydrOXYzine HCL (ATARAX) 50 mg tablet Take 1 Tab by mouth three (3) times daily as needed for Anxiety for up to 10 days. Qty: 30 Tab, Refills: 0               2.   Follow-up Information     Follow up With Specialties Details Why Contact Info    Bren Callejas MD Pediatric Medicine Schedule an appointment as soon as possible for a visit   [address]  [id number]              3.  Return to ED if worse       4. Discharge Medication List as of 2/3/2021 10:33 AM            Diagnosis     Clinical Impression:    1. Panic attacks        Attestations:    Lisa Fulton, DO    Please note that this dictation was completed with [employer], the computer voice recognition software. Quite often unanticipated grammatical, syntax, homophones, and other interpretive errors are inadvertently transcribed by the computer software. Please disregard these errors. Please excuse any errors that have escaped final proofreading. Thank you.

## 2021-02-03 NOTE — ED TRIAGE NOTES
Third ER visit in last three days, dx here with \"mesenteric andenitis\", concerned that \"something serious is wrong with me\", [facility] yesterday

## 2021-02-10 ENCOUNTER — HOSPITAL ENCOUNTER (EMERGENCY)
Age: 21
Discharge: HOME | End: 2021-02-10
Payer: MEDICAID

## 2021-02-10 VITALS
WEIGHT: 250 LBS | OXYGEN SATURATION: 99 % | RESPIRATION RATE: 20 BRPM | BODY MASS INDEX: 35 KG/M2 | DIASTOLIC BLOOD PRESSURE: 80 MMHG | SYSTOLIC BLOOD PRESSURE: 138 MMHG | HEART RATE: 99 BPM | TEMPERATURE: 98.8 F | HEIGHT: 71 IN

## 2021-02-10 DIAGNOSIS — H66.90 ACUTE OTITIS MEDIA, UNSPECIFIED OTITIS MEDIA TYPE: Primary | ICD-10-CM

## 2021-02-10 PROCEDURE — 99282 EMERGENCY DEPT VISIT SF MDM: CPT

## 2021-02-10 RX ORDER — AMOXICILLIN 875 MG/1
875 TABLET, FILM COATED ORAL 2 TIMES DAILY
Qty: 10 TAB | Refills: 0 | Status: SHIPPED | OUTPATIENT
Start: 2021-02-10 | End: 2021-02-15

## 2021-02-10 RX ORDER — OMEPRAZOLE 20 MG/1
CAPSULE, DELAYED RELEASE ORAL
COMMUNITY
Start: 2021-02-02 | End: 2021-03-01

## 2021-02-10 RX ORDER — FAMOTIDINE 20 MG/1
TABLET, FILM COATED ORAL
COMMUNITY
Start: 2021-02-02 | End: 2021-02-10 | Stop reason: SDUPTHER

## 2021-02-10 NOTE — Clinical Note
[address] [place] [address] 
[phone] Work/School Note Date: 2/10/2021 To Whom It May concern: 
 
 
Renetta Zurita was seen and treated today in the emergency room by the following provider(s): 
No providers found. Renetta Zurita is excused from work/school on 02/10/21. He is clear to return to work/school on 02/11/21. Sincerely, Lyman Seip, DO

## 2021-02-10 NOTE — ED TRIAGE NOTES
Pt has multiple chronic C/O that \"only happen when I'm trying to fall asleep\" stating these symptoms have persisted \"for years\"; states he was seen by PCP 3 days ago and \"told everything looked good, he told me I'm healthy\". Only new c/o is L tragus discomfort x2 days.

## 2021-02-11 NOTE — ED PROVIDER NOTES
EMERGENCY DEPARTMENT HISTORY AND PHYSICAL EXAM      Date: 2/10/2021  Patient Name: Neda Jain    History of Presenting Illness     Chief Complaint   Patient presents with    Ear Pain       History Provided By:     HPI: Neda Jain, is an extremely pleasant 21 y.o. male presenting to the ED with a chief complaint of left ear pain. He states it started 2 to 3 days ago. He states the pain is waxing and waning but is overall very uncomfortable. He states that its causing a mild headache on the left side. He denies any fevers, chills or rigors. He has not had any drainage from the ear. He is eating and drinking well. He denies difficulty hearing. It does not hurt if he touches the outside of his ear. He does not have a history of recurrent ear infections. He has not take anything for his pain. There are no other complaints, changes, or physical findings at this time. PCP: Regan Flynn MD    No current facility-administered medications on file prior to encounter. Current Outpatient Medications on File Prior to Encounter   Medication Sig Dispense Refill    omeprazole (PRILOSEC) 20 mg capsule TAKE 1 CAPSULE IN THE MORNING      [DISCONTINUED] famotidine (PEPCID) 20 mg tablet TAKE 1 TABLET BY MOUTH TWICE A DAY      hydrOXYzine HCL (ATARAX) 50 mg tablet Take 1 Tab by mouth three (3) times daily as needed for Anxiety for up to 10 days. 30 Tab 0       Past History     Past Medical History:  Past Medical History:   Diagnosis Date    Anxiety     GERD (gastroesophageal reflux disease)        Past Surgical History:  History reviewed. No pertinent surgical history. Family History:  History reviewed. No pertinent family history.     Social History:  Social History     Tobacco Use    Smoking status: Never Smoker    Smokeless tobacco: Never Used   Substance Use Topics    Alcohol use: Never     Frequency: Never    Drug use: Never       Allergies:  No Known Allergies      Review of Systems Review of Systems   Constitutional: Negative for activity change, appetite change, chills, fatigue and fever. HENT: Positive for ear pain. Negative for congestion, ear discharge, hearing loss and sore throat. Eyes: Negative for photophobia and visual disturbance. Respiratory: Negative for cough, shortness of breath and wheezing. Cardiovascular: Negative for chest pain, palpitations and leg swelling. Gastrointestinal: Negative for abdominal pain, diarrhea, nausea and vomiting. Endocrine: Negative for cold intolerance and heat intolerance. Musculoskeletal: Negative for gait problem and joint swelling. Skin: Negative for color change and rash. Neurological: Negative for dizziness and headaches. Physical Exam     Physical Exam  Constitutional:       Appearance: Normal appearance. HENT:      Head: Normocephalic and atraumatic. Comments: No pain over the left mastoid process with palpation     Right Ear: Tympanic membrane, ear canal and external ear normal.      Ears:      Comments: Tympanic membrane on the left is slightly erythematous, clear fluid behind the TM, slightly bulging     Mouth/Throat:      Mouth: Mucous membranes are moist.      Pharynx: Oropharynx is clear. Eyes:      Extraocular Movements: Extraocular movements intact. Conjunctiva/sclera: Conjunctivae normal.   Neck:      Musculoskeletal: Normal range of motion and neck supple. Cardiovascular:      Rate and Rhythm: Normal rate and regular rhythm. Heart sounds: Normal heart sounds. Pulmonary:      Effort: Pulmonary effort is normal. No respiratory distress. Breath sounds: Normal breath sounds. No wheezing, rhonchi or rales. Abdominal:      General: There is no distension. Palpations: Abdomen is soft. Tenderness: There is no abdominal tenderness. There is no guarding. Musculoskeletal:         General: No deformity. Right lower leg: No edema. Left lower leg: No edema.    Skin: Findings: No erythema or rash. Neurological:      General: No focal deficit present. Mental Status: He is alert and oriented to person, place, and time. Gait: Gait normal.   Psychiatric:         Mood and Affect: Mood normal.         Behavior: Behavior normal.         Lab and Diagnostic Study Results     Labs -   No results found for this or any previous visit (from the past 12 hour(s)). Radiologic Studies -   @lastxrresult@  CT Results  (Last 48 hours)    None        CXR Results  (Last 48 hours)    None            Medical Decision Making   - I am the first provider for this patient. - I reviewed the vital signs, available nursing notes, past medical history, past surgical history, family history and social history. - Initial assessment performed. The patients presenting problems have been discussed, and they are in agreement with the care plan formulated and outlined with them. I have encouraged them to ask questions as they arise throughout their visit. Vital Signs-Reviewed the patient's vital signs. Patient Vitals for the past 12 hrs:   Temp Pulse Resp BP SpO2   02/10/21 1827 98.8 °F (37.1 °C) 99 20 138/80 99 %         ED Course/ Provider Notes (Medical Decision Making):     Patient presented to the emergency department with a chief complaint of left ear pain. Symptoms and exam findings consistent with otitis media. He was treated with amoxicillin. He will follow up with his PCP. Saba Nelson was given a thorough list of signs and symptoms that would warrant an immediate return to the emergency department. Otherwise Saba Nelson will follow up with PCP.        Procedures   Medical Decision Makingedical Decision Making  Performed by: Shirlene Slater DO  Procedures  None       Disposition   Disposition:     Home    The patient verbally convey understanding and agreement of the signs, symptoms, diagnosis, treatment and prognosis and additionally agrees to follow up as recommended with their PCP in 24 - 48 hours. They also agree with the care-plan and convey that all of their questions have been answered. I have also put together some discharge instructions for them that include: 1) educational information regarding their diagnosis, 2) how to care for their diagnosis at home, as well a 3) list of reasons why they would want to return to the ED prior to their follow-up appointment, should their condition change. DISCHARGE PLAN:    1. Current Discharge Medication List      START taking these medications    Details   amoxicillin (AMOXIL) 875 mg tablet Take 1 Tab by mouth two (2) times a day for 5 days. Qty: 10 Tab, Refills: 0         CONTINUE these medications which have NOT CHANGED    Details   omeprazole (PRILOSEC) 20 mg capsule TAKE 1 CAPSULE IN THE MORNING      hydrOXYzine HCL (ATARAX) 50 mg tablet Take 1 Tab by mouth three (3) times daily as needed for Anxiety for up to 10 days. Qty: 30 Tab, Refills: 0         STOP taking these medications       famotidine (PEPCID) 20 mg tablet Comments:   Reason for Stoppin.   Follow-up Information    None           3. Return to ED if worse       4. Current Discharge Medication List      START taking these medications    Details   amoxicillin (AMOXIL) 875 mg tablet Take 1 Tab by mouth two (2) times a day for 5 days. Qty: 10 Tab, Refills: 0               Diagnosis     Clinical Impression:    1. Acute otitis media, unspecified otitis media type        Attestations:    Lester Siemens, DO    Please note that this dictation was completed with [employer], the computer voice recognition software. Quite often unanticipated grammatical, syntax, homophones, and other interpretive errors are inadvertently transcribed by the computer software. Please disregard these errors. Please excuse any errors that have escaped final proofreading. Thank you.

## 2021-02-23 ENCOUNTER — HOSPITAL ENCOUNTER (EMERGENCY)
Age: 21
Discharge: HOME | End: 2021-02-23
Attending: EMERGENCY MEDICINE
Payer: MEDICAID

## 2021-02-23 ENCOUNTER — APPOINTMENT (OUTPATIENT)
Dept: GENERAL RADIOLOGY | Age: 21
End: 2021-02-23
Attending: EMERGENCY MEDICINE
Payer: MEDICAID

## 2021-02-23 VITALS
TEMPERATURE: 98.5 F | RESPIRATION RATE: 16 BRPM | HEART RATE: 85 BPM | DIASTOLIC BLOOD PRESSURE: 72 MMHG | WEIGHT: 260 LBS | OXYGEN SATURATION: 98 % | SYSTOLIC BLOOD PRESSURE: 139 MMHG | BODY MASS INDEX: 35.21 KG/M2 | HEIGHT: 72 IN

## 2021-02-23 DIAGNOSIS — R07.89 ATYPICAL CHEST PAIN: Primary | ICD-10-CM

## 2021-02-23 DIAGNOSIS — F41.0 PANIC ATTACKS: ICD-10-CM

## 2021-02-23 LAB
ANION GAP SERPL CALC-SCNC: 5 MMOL/L (ref 5–15)
ATRIAL RATE: 88 BPM
BASOPHILS # BLD: 0 K/UL (ref 0–0.1)
BASOPHILS NFR BLD: 0 % (ref 0–1)
BUN SERPL-MCNC: 12 MG/DL (ref 6–20)
BUN/CREAT SERPL: 13 (ref 12–20)
CA-I BLD-MCNC: 9.7 MG/DL (ref 8.5–10.1)
CALCULATED P AXIS, ECG09: 79 DEGREES
CALCULATED R AXIS, ECG10: 82 DEGREES
CALCULATED T AXIS, ECG11: -87 DEGREES
CHLORIDE SERPL-SCNC: 106 MMOL/L (ref 97–108)
CO2 SERPL-SCNC: 29 MMOL/L (ref 21–32)
CREAT SERPL-MCNC: 0.94 MG/DL (ref 0.7–1.3)
DIAGNOSIS, 93000: NORMAL
DIFFERENTIAL METHOD BLD: NORMAL
EOSINOPHIL # BLD: 0.1 K/UL (ref 0–0.4)
EOSINOPHIL NFR BLD: 1 % (ref 0–7)
ERYTHROCYTE [DISTWIDTH] IN BLOOD BY AUTOMATED COUNT: 13 % (ref 11.5–14.5)
GLUCOSE SERPL-MCNC: 92 MG/DL (ref 65–100)
HCT VFR BLD AUTO: 42.7 % (ref 36.6–50.3)
HGB BLD-MCNC: 14.1 G/DL (ref 12.1–17)
IMM GRANULOCYTES # BLD AUTO: 0 K/UL (ref 0–0.04)
IMM GRANULOCYTES NFR BLD AUTO: 0 % (ref 0–0.5)
LYMPHOCYTES # BLD: 2.4 K/UL (ref 0.8–3.5)
LYMPHOCYTES NFR BLD: 28 % (ref 12–49)
MCH RBC QN AUTO: 31.3 PG (ref 26–34)
MCHC RBC AUTO-ENTMCNC: 33 G/DL (ref 30–36.5)
MCV RBC AUTO: 94.7 FL (ref 80–99)
MONOCYTES # BLD: 0.6 K/UL (ref 0–1)
MONOCYTES NFR BLD: 7 % (ref 5–13)
NEUTS SEG # BLD: 5.6 K/UL (ref 1.8–8)
NEUTS SEG NFR BLD: 64 % (ref 32–75)
P-R INTERVAL, ECG05: 152 MS
PLATELET # BLD AUTO: 209 K/UL (ref 150–400)
PMV BLD AUTO: 12.8 FL (ref 8.9–12.9)
POTASSIUM SERPL-SCNC: 3.8 MMOL/L (ref 3.5–5.1)
Q-T INTERVAL, ECG07: 368 MS
QRS DURATION, ECG06: 92 MS
QTC CALCULATION (BEZET), ECG08: 445 MS
RBC # BLD AUTO: 4.51 M/UL (ref 4.1–5.7)
SODIUM SERPL-SCNC: 140 MMOL/L (ref 136–145)
TROPONIN I SERPL-MCNC: <0.05 NG/ML
VENTRICULAR RATE, ECG03: 88 BPM
WBC # BLD AUTO: 8.7 K/UL (ref 4.1–11.1)

## 2021-02-23 PROCEDURE — 84484 ASSAY OF TROPONIN QUANT: CPT

## 2021-02-23 PROCEDURE — 80048 BASIC METABOLIC PNL TOTAL CA: CPT

## 2021-02-23 PROCEDURE — 99283 EMERGENCY DEPT VISIT LOW MDM: CPT

## 2021-02-23 PROCEDURE — 93005 ELECTROCARDIOGRAM TRACING: CPT

## 2021-02-23 PROCEDURE — 71046 X-RAY EXAM CHEST 2 VIEWS: CPT

## 2021-02-23 PROCEDURE — 85025 COMPLETE CBC W/AUTO DIFF WBC: CPT

## 2021-02-23 PROCEDURE — 36415 COLL VENOUS BLD VENIPUNCTURE: CPT

## 2021-02-23 RX ORDER — CITALOPRAM 10 MG/1
20 TABLET ORAL DAILY
Qty: 30 TAB | Refills: 0 | Status: SHIPPED | OUTPATIENT
Start: 2021-02-23 | End: 2021-03-01

## 2021-02-23 NOTE — ED TRIAGE NOTES
Pt states for several weeks his heart starts racing and he feels \"weird\" at random times    Pt seen at [facility] recently for the same and was told it was anxiety     Pt followed up with PCP and was told to see a psychiatrist, however pt is still trying to find one     Denies pain

## 2021-02-23 NOTE — ED PROVIDER NOTES
EMERGENCY DEPARTMENT HISTORY AND PHYSICAL EXAM      Date: 2/23/2021  Patient Name: Cyndee Davis    History of Presenting Illness     Chief Complaint   Patient presents with    Palpitations    Shortness of Breath       History Provided By: Patient    HPI: Cyndee Davis, 21 y.o. male presents to the ED with cc of   Chief Complaint   Patient presents with    Palpitations    Shortness of Breath     Pt states for several weeks his heart starts racing and he feels \"weird\" at random times     Pt seen at [facility] recently for the same and was told it was anxiety      Pt followed up with PCP and was told to see a psychiatrist, however pt is still trying to find one      Denies pain   And history of anxiety and panic attack in the past    There are no other complaints, changes, or physical findings at this time. PCP: Alex Galindo MD    No current facility-administered medications on file prior to encounter. Current Outpatient Medications on File Prior to Encounter   Medication Sig Dispense Refill    omeprazole (PRILOSEC) 20 mg capsule TAKE 1 CAPSULE IN THE MORNING         Past History     Past Medical History:  Past Medical History:   Diagnosis Date    Anxiety     GERD (gastroesophageal reflux disease)        Past Surgical History:  No past surgical history on file. Family History:  No family history on file. Social History:  Social History     Tobacco Use    Smoking status: Never Smoker    Smokeless tobacco: Never Used   Substance Use Topics    Alcohol use: Never     Frequency: Never    Drug use: Never       Allergies:  No Known Allergies      Review of Systems   Review of Systems   Constitutional: Negative. Negative for chills. HENT: Negative for congestion, facial swelling, rhinorrhea and sore throat. Eyes: Negative. Negative for photophobia and pain. Respiratory: Negative for cough, shortness of breath and wheezing. Cardiovascular: Negative. Negative for chest pain. Gastrointestinal: Negative for abdominal distention and abdominal pain. Genitourinary: Negative. Musculoskeletal: Negative. Allergic/Immunologic: Negative for immunocompromised state. Neurological: Negative. Negative for syncope and weakness. Hematological: Negative. Psychiatric/Behavioral: Negative for self-injury and suicidal ideas. The patient is nervous/anxious. Physical Exam   Physical Exam  Vitals signs and nursing note reviewed. Constitutional:       Appearance: Normal appearance. He is normal weight. HENT:      Head: Normocephalic and atraumatic. Nose: No congestion or rhinorrhea. Eyes:      Extraocular Movements: Extraocular movements intact. Pupils: Pupils are equal, round, and reactive to light. Neck:      Musculoskeletal: Normal range of motion and neck supple. Cardiovascular:      Rate and Rhythm: Normal rate and regular rhythm. Pulmonary:      Effort: Pulmonary effort is normal.      Breath sounds: Normal breath sounds. Abdominal:      General: Abdomen is flat. Bowel sounds are normal. There is no distension. Tenderness: There is no abdominal tenderness. There is no guarding. Musculoskeletal: Normal range of motion. Skin:     General: Skin is warm and dry. Neurological:      General: No focal deficit present. Mental Status: He is alert and oriented to person, place, and time.    Psychiatric:         Mood and Affect: Mood normal.         Diagnostic Study Results     Labs -     Recent Results (from the past 12 hour(s))   EKG, 12 LEAD, INITIAL    Collection Time: 02/23/21  3:57 PM   Result Value Ref Range    Ventricular Rate 88 BPM    Atrial Rate 88 BPM    P-R Interval 152 ms    QRS Duration 92 ms    Q-T Interval 368 ms    QTC Calculation (Bezet) 445 ms    Calculated P Axis 79 degrees    Calculated R Axis 82 degrees    Calculated T Axis -87 degrees    Diagnosis       Normal sinus rhythm with sinus arrhythmia  Left ventricular hypertrophy with repolarization abnormality  Abnormal ECG  When compared with ECG of 10-OCT-2019 10:11,  No significant change was found  Confirmed by [facility] OCTAVIO BEARD ([id number]) on 2/23/2021 4:21:24 PM     CBC WITH AUTOMATED DIFF    Collection Time: 02/23/21  6:30 PM   Result Value Ref Range    WBC 8.7 4.1 - 11.1 K/uL    RBC 4.51 4.10 - 5.70 M/uL    HGB 14.1 12.1 - 17.0 g/dL    HCT 42.7 36.6 - 50.3 %    MCV 94.7 80.0 - 99.0 FL    MCH 31.3 26.0 - 34.0 PG    MCHC 33.0 30.0 - 36.5 g/dL    RDW 13.0 11.5 - 14.5 %    PLATELET 889 010 - 109 K/uL    MPV 12.8 8.9 - 12.9 FL    NEUTROPHILS 64 32 - 75 %    LYMPHOCYTES 28 12 - 49 %    MONOCYTES 7 5 - 13 %    EOSINOPHILS 1 0 - 7 %    BASOPHILS 0 0 - 1 %    IMMATURE GRANULOCYTES 0 0.0 - 0.5 %    ABS. NEUTROPHILS 5.6 1.8 - 8.0 K/UL    ABS. LYMPHOCYTES 2.4 0.8 - 3.5 K/UL    ABS. MONOCYTES 0.6 0.0 - 1.0 K/UL    ABS. EOSINOPHILS 0.1 0.0 - 0.4 K/UL    ABS. BASOPHILS 0.0 0.0 - 0.1 K/UL    ABS. IMM. GRANS. 0.0 0.00 - 0.04 K/UL    DF AUTOMATED     METABOLIC PANEL, BASIC    Collection Time: 02/23/21  6:30 PM   Result Value Ref Range    Sodium 140 136 - 145 mmol/L    Potassium 3.8 3.5 - 5.1 mmol/L    Chloride 106 97 - 108 mmol/L    CO2 29 21 - 32 mmol/L    Anion gap 5 5 - 15 mmol/L    Glucose 92 65 - 100 mg/dL    BUN 12 6 - 20 mg/dL    Creatinine 0.94 0.70 - 1.30 mg/dL    BUN/Creatinine ratio 13 12 - 20      GFR est AA >60 >60 ml/min/1.73m2    GFR est non-AA >60 >60 ml/min/1.73m2    Calcium 9.7 8.5 - 10.1 mg/dL   TROPONIN I    Collection Time: 02/23/21  6:30 PM   Result Value Ref Range    Troponin-I, Qt. <0.05 <0.05 ng/mL       Labs reviewed by me    Radiologic Studies -   XR CHEST PA LAT   Final Result   No acute cardiopulmonary abnormality. CT Results  (Last 48 hours)    None        CXR Results  (Last 48 hours)               02/23/21 1615  XR CHEST PA LAT Final result    Impression:  No acute cardiopulmonary abnormality.        Narrative:  EXAMINATION: XR CHEST PA LAT       HISTORY: Palpitations COMPARISON: 10/10/2019       FINDINGS: 2 views. The lungs are clear. There is no pneumothorax or pleural   effusion. Heart size and mediastinal contours are normal.                   Medical Decision Making     I am the first provider for this patient. I reviewed the vital signs, available nursing notes, past medical history, past surgical history, family history and social history. RADIOLOGY report and LABS reviewed by me    Vital Signs-Reviewed the patient's vital signs. Patient Vitals for the past 12 hrs:   Temp Pulse Resp BP SpO2   02/23/21 1938 98.5 °F (36.9 °C) 85 16 139/72 98 %   02/23/21 1550 98.6 °F (37 °C) 89 16 (!) 147/76 97 %       EKG interpretation: (Preliminary)  EKG done at 1557 shows sinus rhythm with early repolarization and left ventricular hypertrophy T wave inversion and inferior and lateral leads no acute ST changes    Records Reviewed: Nurse's note. Provider Notes (Medical Decision Making):    Patient presents with DIFF DX : Atypical chest pain, chest wall pain, hyperventilation, anxiety        ED Course:   Initial assessment performed. The patients presenting problems have been discussed, and they are in agreement with the care plan formulated and outlined with them. I have encouraged them to ask questions as they arise throughout their visit. TREATMENT RESPONSE -Stable          Daniel Rasheed MD      Disposition:  Discharged   Diagnostic tests were reviewed and questions answered. Diagnosis, care plan and treatment options were discussed. The patient understand instructions and will follow up as directed. Condition stable    Admitting Provider:  No admitting provider for patient encounter. Consulting Provider:  No ref. provider found       DISCHARGE PLAN:  1. Current Discharge Medication List      START taking these medications    Details   citalopram (CeleXA) 10 mg tablet Take 2 Tabs by mouth daily for 30 days. Qty: 30 Tab, Refills: 0           2. Follow-up Information     Follow up With Specialties Details Why Contact Info    Sulma Guadalupe MD Pediatric Medicine   [place] [address] [phone]          3. Return to ED if worse     Diagnosis     Clinical Impression:     ICD-10-CM ICD-9-CM    1. Atypical chest pain  R07.89 786.59    2. Panic attacks  F41.0 300.01         Attestations:    Colt Larry MD    Please note that this dictation was completed with [employer], the [employer]Things voice recognition software. Quite often unanticipated grammatical, syntax, homophones, and other interpretive errors are inadvertently transcribed by the computer software. Please disregard these errors. Please excuse any errors that have escaped final proofreading. Thank you.

## 2021-02-24 NOTE — DISCHARGE INSTRUCTIONS
Thank you! Thank you for allowing me to care for you in the emergency department. I sincerely hope that you are satisfied with your visit today. It is my goal to provide you with excellent care. Below you will find a list of your labs and imaging from your visit today. Should you have any questions regarding these results please do not hesitate to call the emergency department. Labs -     Recent Results (from the past 12 hour(s))   EKG, 12 LEAD, INITIAL    Collection Time: 02/23/21  3:57 PM   Result Value Ref Range    Ventricular Rate 88 BPM    Atrial Rate 88 BPM    P-R Interval 152 ms    QRS Duration 92 ms    Q-T Interval 368 ms    QTC Calculation (Bezet) 445 ms    Calculated P Axis 79 degrees    Calculated R Axis 82 degrees    Calculated T Axis -87 degrees    Diagnosis       Normal sinus rhythm with sinus arrhythmia  Left ventricular hypertrophy with repolarization abnormality  Abnormal ECG  When compared with ECG of 10-OCT-2019 10:11,  No significant change was found  Confirmed by MILLICENT BEARD OCTAVIO ([id number]) on 2/23/2021 4:21:24 PM     CBC WITH AUTOMATED DIFF    Collection Time: 02/23/21  6:30 PM   Result Value Ref Range    WBC 8.7 4.1 - 11.1 K/uL    RBC 4.51 4.10 - 5.70 M/uL    HGB 14.1 12.1 - 17.0 g/dL    HCT 42.7 36.6 - 50.3 %    MCV 94.7 80.0 - 99.0 FL    MCH 31.3 26.0 - 34.0 PG    MCHC 33.0 30.0 - 36.5 g/dL    RDW 13.0 11.5 - 14.5 %    PLATELET 462 945 - 756 K/uL    MPV 12.8 8.9 - 12.9 FL    NEUTROPHILS 64 32 - 75 %    LYMPHOCYTES 28 12 - 49 %    MONOCYTES 7 5 - 13 %    EOSINOPHILS 1 0 - 7 %    BASOPHILS 0 0 - 1 %    IMMATURE GRANULOCYTES 0 0.0 - 0.5 %    ABS. NEUTROPHILS 5.6 1.8 - 8.0 K/UL    ABS. LYMPHOCYTES 2.4 0.8 - 3.5 K/UL    ABS. MONOCYTES 0.6 0.0 - 1.0 K/UL    ABS. EOSINOPHILS 0.1 0.0 - 0.4 K/UL    ABS. BASOPHILS 0.0 0.0 - 0.1 K/UL    ABS. IMM.  GRANS. 0.0 0.00 - 0.04 K/UL    DF AUTOMATED     METABOLIC PANEL, BASIC    Collection Time: 02/23/21  6:30 PM   Result Value Ref Range    Sodium 140 136 - 145 mmol/L    Potassium 3.8 3.5 - 5.1 mmol/L    Chloride 106 97 - 108 mmol/L    CO2 29 21 - 32 mmol/L    Anion gap 5 5 - 15 mmol/L    Glucose 92 65 - 100 mg/dL    BUN 12 6 - 20 mg/dL    Creatinine 0.94 0.70 - 1.30 mg/dL    BUN/Creatinine ratio 13 12 - 20      GFR est AA >60 >60 ml/min/1.73m2    GFR est non-AA >60 >60 ml/min/1.73m2    Calcium 9.7 8.5 - 10.1 mg/dL   TROPONIN I    Collection Time: 02/23/21  6:30 PM   Result Value Ref Range    Troponin-I, Qt. <0.05 <0.05 ng/mL       Radiologic Studies -   XR CHEST PA LAT   Final Result   No acute cardiopulmonary abnormality. CT Results  (Last 48 hours)      None          CXR Results  (Last 48 hours)                 02/23/21 1615  XR CHEST PA LAT Final result    Impression:  No acute cardiopulmonary abnormality. Narrative:  EXAMINATION: XR CHEST PA LAT       HISTORY: Palpitations       COMPARISON: 10/10/2019       FINDINGS: 2 views. The lungs are clear. There is no pneumothorax or pleural   effusion. Heart size and mediastinal contours are normal.                      If you feel that you have not received excellent quality care or timely care, please ask to speak to the nurse manager. Please choose us in the future for your continued health care needs. ------------------------------------------------------------------------------------------------------------  The exam and treatment you received in the Emergency Department were for an urgent problem and are not intended as complete care. It is important that you follow-up with a doctor, nurse practitioner, or physician assistant to:  (1) confirm your diagnosis,  (2) re-evaluation of changes in your illness and treatment, and  (3) for ongoing care. If your symptoms become worse or you do not improve as expected and you are unable to reach your usual health care provider, you should return to the Emergency Department. We are available 24 hours a day.      Please take your discharge instructions with you when you go to your follow-up appointment. If you have any problem arranging a follow-up appointment, contact the Emergency Department immediately. If a prescription has been provided, please have it filled as soon as possible to prevent a delay in treatment. Read the entire medication instruction sheet provided to you by the pharmacy. If you have any questions or reservations about taking the medication due to side effects or interactions with other medications, please call your primary care physician or contact the ER to speak with the charge nurse. Make an appointment with your family doctor or the physician you were referred to for follow-up of this visit as instructed on your discharge paperwork, as this is a mandatory follow-up. Return to the ER if you are unable to be seen or if you are unable to be seen in a timely manner. If you have any problem arranging the follow-up visit, contact the Emergency Department immediately.

## 2021-03-01 ENCOUNTER — HOSPITAL ENCOUNTER (EMERGENCY)
Age: 21
Discharge: HOME | End: 2021-03-01
Attending: FAMILY MEDICINE
Payer: MEDICAID

## 2021-03-01 VITALS
WEIGHT: 259 LBS | BODY MASS INDEX: 36.26 KG/M2 | TEMPERATURE: 98 F | DIASTOLIC BLOOD PRESSURE: 73 MMHG | SYSTOLIC BLOOD PRESSURE: 121 MMHG | OXYGEN SATURATION: 98 % | RESPIRATION RATE: 18 BRPM | HEIGHT: 71 IN | HEART RATE: 81 BPM

## 2021-03-01 DIAGNOSIS — F41.1 ANXIETY STATE: Primary | ICD-10-CM

## 2021-03-01 PROCEDURE — 99282 EMERGENCY DEPT VISIT SF MDM: CPT

## 2021-03-01 NOTE — ED TRIAGE NOTES
\" I have just been feeling dizzy for a few days and I have been so anxious my legs and all have been shaking \"

## 2021-03-02 NOTE — ED PROVIDER NOTES
[facility]    HISTORY AND PHYSICAL EXAM      Date: 3/1/2021  Patient Name: Umm Cash  YOB: 2000  MRN: [id number]  Room:  [place] [address]    History of Presenting Illness     Chief Complaint:  Dizziness and Anxiety     History Provided By: Patient  HPI/ROS Limits: None    HPI: Chani Walton is a 21 y.o. male presenting to the ED with CC of Dizziness and Anxiety with initial onset just prior to arrival.  The patient admits to h/o anxiety attacks. Confounding info is that the patient only ate one meal today and experienced shakiness and lightheadedness which was followed by worsening anxiety. The patient recently quit working at [employer] and has strayed away from regular exercise and healthy meals. Currently, the patient denies F/C, hemoptysis, dyspnea, chest pain, NVDC, abdominal pain, or  complaints. There are no other complaints, changes, or physical findings at this time. PCP: Mil Camacho MD    No current facility-administered medications on file prior to encounter. Current Outpatient Medications on File Prior to Encounter   Medication Sig Dispense Refill    [DISCONTINUED] citalopram (CeleXA) 10 mg tablet Take 2 Tabs by mouth daily for 30 days. 30 Tab 0    [DISCONTINUED] omeprazole (PRILOSEC) 20 mg capsule TAKE 1 CAPSULE IN THE MORNING         Past History     PAST MEDICAL  The patient  has a past medical history of Anxiety and GERD (gastroesophageal reflux disease). PAST SURGICAL  The patient  has no past surgical history on file. SOCIAL HISTORY  The patient  reports that he has never smoked. He has never used smokeless tobacco. He reports that he does not drink alcohol or use drugs. Allergies: The patient has No Known Allergies. Review of Systems     Review of Systems   Constitutional: Negative for chills and fever. HENT: Negative. Eyes: Negative for visual disturbance. Respiratory: Negative for cough and shortness of breath. Cardiovascular: Negative for chest pain. Gastrointestinal: Negative for abdominal pain, diarrhea, nausea and vomiting. Endocrine: Negative. Genitourinary: Negative for discharge, dysuria and penile pain. Musculoskeletal: Negative. Skin: Negative. Allergic/Immunologic: Negative. Neurological: Negative. Hematological: Negative. Psychiatric/Behavioral: The patient is nervous/anxious. Physical Exam     Vital Signs-Reviewed the patient's vital signs. Patient Vitals for the past 24 hrs:   Temp Pulse Resp BP SpO2   03/01/21 1827 98 °F (36.7 °C) 81 18 121/73 98 %      Physical Exam  Constitutional:       Appearance: Normal appearance. He is obese. HENT:      Head: Normocephalic and atraumatic. Mouth/Throat:      Mouth: Mucous membranes are moist.   Eyes:      Extraocular Movements: Extraocular movements intact. Conjunctiva/sclera: Conjunctivae normal.      Pupils: Pupils are equal, round, and reactive to light. Neck:      Musculoskeletal: Normal range of motion and neck supple. Comments: Acanthosis nigricans  Cardiovascular:      Rate and Rhythm: Normal rate and regular rhythm. Pulses: Normal pulses. Heart sounds: Normal heart sounds. Pulmonary:      Effort: Pulmonary effort is normal.      Breath sounds: Normal breath sounds. Abdominal:      General: Abdomen is flat. Bowel sounds are normal.      Palpations: Abdomen is soft. Musculoskeletal: Normal range of motion. Skin:     General: Skin is warm and dry. Neurological:      General: No focal deficit present. Mental Status: He is alert and oriented to person, place, and time. Psychiatric:         Mood and Affect: Mood normal.         Behavior: Behavior normal.       Diagnostic Study Results     Labs -   No results found for this or any previous visit (from the past 12 hour(s)).     Radiologic Studies -   No orders to display     CT Results  (Last 48 hours)    None        CXR Results  (Last 48 hours)    None        Procedures/Critical Care     None    Medical Decision Making   I am the first provider for this patient. I reviewed the vital signs, available nursing notes, past medical history, past surgical history, family history and social history. Records Reviewed: Nursing Notes    ED Course:   Initial assessment performed. The patients presenting problems have been discussed, and they are in agreement with the care plan formulated and outlined with them. I have encouraged them to ask questions as they arise throughout their visit. Medications - No data to display    Provider Notes (Medical Decision Making): The patient presented to the emergency department with complaint of anxiety. Evaluation of the patient is negative for acute findings except for mild anxiety. Diagnostic (laboratory/radiographic) evaluation isn't indicated. Symptoms are not suggestive severe anxiety c respiratory distress, ACS, homicidal/suicidal ideation/intent, A/V/T hallucinations, or other serious etiology. These diagnoses have been considered and excluded clinically. Given the low risk of these diagnoses further testing and evaluation does not appear to be indicated at this time. If ordered, results of lab/radiology tests were reviewed and discussed with the patient and/or family. Diagnostic impression and plan were discussed and agreed upon with the patient and/or family. The patient has received maximum benefit from this visit and felt eligible for discharge. Suicidal/homicidal precautions were given. All questions were answered and concerns addressed. The patient was advised to follow-up with the patient's primary care physician, and that should the symptoms worsen or change in anyway that they are to return to the ER immediately for re-evaluation. Patient discharged in stable condition. Diagnosis/Plan/Follow Up     CLINICAL IMPRESSION:      ICD-10-CM ICD-9-CM   1.  Anxiety state  F41.1 300.00 DISPOSITION:  Discharged to Home or Self Care in stable condition. PLAN/FOLLOW UP  1. There are no discharge medications for this patient. 2.  The patient's results have been reviewed with them. The patient has been counseled regarding their diagnosis. The patient verbally conveys understanding and agreement of the signs, symptoms, diagnosis, treatment and prognosis and additionally agrees to follow up as recommended with their PCP. The patient also agrees with the care-plan and conveys that all of their questions have been answered. I have also put together some discharge instructions for them that include: 1) educational information regarding their diagnosis, 2) how to care for their diagnosis at home, as well as a 3) list of reasons why they would want to return to the ED prior to their follow-up appointment, should their condition change. Follow-up Information     Follow up With Specialties Details Why Contact Info    Kavin Moreland MD Pediatric Medicine Schedule an appointment as soon as possible for a visit   [facility] [address] [place] [address]      [address] [place] [address] Emergency Medicine Go to  If symptoms worsen [facility]  [phone]        Return to ED if worse       TULIO Meeks M.D. [facility]  Emergency Department Physician    Please note that this dictation was completed with Dragon, computer voice recognition software. Quite often unanticipated grammatical, syntax, homophones, and other interpretive errors are inadvertently transcribed by the computer software. Please disregard these errors. Additionally, please excuse any errors that have escaped final proofreading.

## 2021-03-05 ENCOUNTER — HOSPITAL ENCOUNTER (EMERGENCY)
Age: 21
Discharge: HOME | End: 2021-03-05
Attending: STUDENT IN AN ORGANIZED HEALTH CARE EDUCATION/TRAINING PROGRAM | Admitting: STUDENT IN AN ORGANIZED HEALTH CARE EDUCATION/TRAINING PROGRAM
Payer: MEDICAID

## 2021-03-05 VITALS
OXYGEN SATURATION: 97 % | WEIGHT: 240 LBS | BODY MASS INDEX: 33.6 KG/M2 | HEART RATE: 79 BPM | HEIGHT: 71 IN | RESPIRATION RATE: 16 BRPM | SYSTOLIC BLOOD PRESSURE: 130 MMHG | TEMPERATURE: 97.8 F | DIASTOLIC BLOOD PRESSURE: 81 MMHG

## 2021-03-05 DIAGNOSIS — F41.1 ANXIETY REACTION: Primary | ICD-10-CM

## 2021-03-05 LAB
ALBUMIN SERPL-MCNC: 4.7 G/DL (ref 3.5–5)
ALBUMIN/GLOB SERPL: 1.1 {RATIO} (ref 1.1–2.2)
ALP SERPL-CCNC: 68 U/L (ref 45–117)
ALT SERPL-CCNC: 27 U/L (ref 12–78)
AMPHET UR QL SCN: NEGATIVE
ANION GAP SERPL CALC-SCNC: 8 MMOL/L (ref 5–15)
APPEARANCE UR: CLEAR
AST SERPL W P-5'-P-CCNC: 20 U/L (ref 15–37)
BACTERIA URNS QL MICRO: NEGATIVE /HPF
BARBITURATES UR QL SCN: NEGATIVE
BASOPHILS # BLD: 0 K/UL (ref 0–0.1)
BASOPHILS NFR BLD: 0 % (ref 0–1)
BENZODIAZ UR QL: NEGATIVE
BILIRUB SERPL-MCNC: 0.5 MG/DL (ref 0.2–1)
BILIRUB UR QL: NEGATIVE
BUN SERPL-MCNC: 10 MG/DL (ref 6–20)
BUN/CREAT SERPL: 11 (ref 12–20)
CA-I BLD-MCNC: 9.9 MG/DL (ref 8.5–10.1)
CANNABINOIDS UR QL SCN: NEGATIVE
CHLORIDE SERPL-SCNC: 105 MMOL/L (ref 97–108)
CO2 SERPL-SCNC: 28 MMOL/L (ref 21–32)
COCAINE UR QL SCN: NEGATIVE
COLOR UR: ABNORMAL
CREAT SERPL-MCNC: 0.95 MG/DL (ref 0.7–1.3)
DIFFERENTIAL METHOD BLD: NORMAL
DRUG SCRN COMMENT,DRGCM: NORMAL
EOSINOPHIL # BLD: 0 K/UL (ref 0–0.4)
EOSINOPHIL NFR BLD: 0 % (ref 0–7)
ERYTHROCYTE [DISTWIDTH] IN BLOOD BY AUTOMATED COUNT: 12.8 % (ref 11.5–14.5)
ETHANOL SERPL-MCNC: <4 MG/DL
GLOBULIN SER CALC-MCNC: 4.2 G/DL (ref 2–4)
GLUCOSE SERPL-MCNC: 88 MG/DL (ref 65–100)
GLUCOSE UR STRIP.AUTO-MCNC: NEGATIVE MG/DL
HCT VFR BLD AUTO: 44 % (ref 36.6–50.3)
HGB BLD-MCNC: 14.6 G/DL (ref 12.1–17)
HGB UR QL STRIP: NEGATIVE
IMM GRANULOCYTES # BLD AUTO: 0 K/UL (ref 0–0.04)
IMM GRANULOCYTES NFR BLD AUTO: 0 % (ref 0–0.5)
KETONES UR QL STRIP.AUTO: 20 MG/DL
LEUKOCYTE ESTERASE UR QL STRIP.AUTO: NEGATIVE
LYMPHOCYTES # BLD: 2.3 K/UL (ref 0.8–3.5)
LYMPHOCYTES NFR BLD: 24 % (ref 12–49)
MCH RBC QN AUTO: 31.2 PG (ref 26–34)
MCHC RBC AUTO-ENTMCNC: 33.2 G/DL (ref 30–36.5)
MCV RBC AUTO: 94 FL (ref 80–99)
METHADONE UR QL: NEGATIVE
MONOCYTES # BLD: 0.7 K/UL (ref 0–1)
MONOCYTES NFR BLD: 7 % (ref 5–13)
MUCOUS THREADS URNS QL MICRO: ABNORMAL /LPF
NEUTS SEG # BLD: 6.6 K/UL (ref 1.8–8)
NEUTS SEG NFR BLD: 69 % (ref 32–75)
NITRITE UR QL STRIP.AUTO: NEGATIVE
OPIATES UR QL: NEGATIVE
PCP UR QL: NEGATIVE
PH UR STRIP: 5 [PH] (ref 5–8)
PLATELET # BLD AUTO: 206 K/UL (ref 150–400)
PMV BLD AUTO: 12.8 FL (ref 8.9–12.9)
POTASSIUM SERPL-SCNC: 3.4 MMOL/L (ref 3.5–5.1)
PROT SERPL-MCNC: 8.9 G/DL (ref 6.4–8.2)
PROT UR STRIP-MCNC: 30 MG/DL
RBC # BLD AUTO: 4.68 M/UL (ref 4.1–5.7)
RBC #/AREA URNS HPF: ABNORMAL /HPF (ref 0–5)
SARS-COV-2, COV2: NORMAL
SARS-COV-2, COV2: NOT DETECTED
SODIUM SERPL-SCNC: 141 MMOL/L (ref 136–145)
SP GR UR REFRACTOMETRY: 1.03 (ref 1–1.03)
UA: UC IF INDICATED,UAUC: ABNORMAL
UROBILINOGEN UR QL STRIP.AUTO: 4 EU/DL (ref 0.1–1)
WBC # BLD AUTO: 9.6 K/UL (ref 4.1–11.1)
WBC URNS QL MICRO: ABNORMAL /HPF (ref 0–4)

## 2021-03-05 PROCEDURE — 80307 DRUG TEST PRSMV CHEM ANLYZR: CPT

## 2021-03-05 PROCEDURE — 85025 COMPLETE CBC W/AUTO DIFF WBC: CPT

## 2021-03-05 PROCEDURE — 87635 SARS-COV-2 COVID-19 AMP PRB: CPT

## 2021-03-05 PROCEDURE — 36415 COLL VENOUS BLD VENIPUNCTURE: CPT

## 2021-03-05 PROCEDURE — 81001 URINALYSIS AUTO W/SCOPE: CPT

## 2021-03-05 PROCEDURE — 93005 ELECTROCARDIOGRAM TRACING: CPT

## 2021-03-05 PROCEDURE — 99283 EMERGENCY DEPT VISIT LOW MDM: CPT

## 2021-03-05 PROCEDURE — 80053 COMPREHEN METABOLIC PANEL: CPT

## 2021-03-05 PROCEDURE — 82077 ASSAY SPEC XCP UR&BREATH IA: CPT

## 2021-03-05 NOTE — ED PROVIDER NOTES
EMERGENCY DEPARTMENT HISTORY AND PHYSICAL EXAM      Date: 3/5/2021  Patient Name: Kenneth Haji    History of Presenting Illness     Chief Complaint   Patient presents with   [address]       History Provided By: Patient    HPI: Kenneth Haji, 21 y.o. male with a past medical history significant for anxiety, depression presents to the ED with cc of feeling anxious, complaining of abdominal pains chest pains, palpitations. Patient states that recently he was diagnosed with mesenteric lymphadenitis which patient is very anxious about, states he was looking up on [employer] and is concerned about medical condition. Patient states this is triggered his anxiety, is complaining of worsening appetite, chest pain, palpitations. Patient denies any depression, denies any suicidal ideation, denies any hallucinations, no drug use alcohol use. There are no other complaints, changes, or physical findings at this time. PCP: Jaison Reynoso MD        Past History     Past Medical History:  Past Medical History:   Diagnosis Date    Anxiety     GERD (gastroesophageal reflux disease)     Psychiatric disorder     Anxiety, Depression       Past Surgical History:  History reviewed. No pertinent surgical history. Family History:  History reviewed. No pertinent family history. Social History:  Social History     Tobacco Use    Smoking status: Never Smoker    Smokeless tobacco: Never Used   Substance Use Topics    Alcohol use: Never     Frequency: Never    Drug use: Never       Allergies:  No Known Allergies      Review of Systems     Review of Systems   Constitutional: Negative for activity change, chills and fever. HENT: Negative for congestion and sore throat. Eyes: Negative for photophobia and visual disturbance. Respiratory: Negative for apnea, chest tightness and shortness of breath. Cardiovascular: Positive for chest pain and palpitations. Negative for leg swelling.    Gastrointestinal: Positive for nausea. Negative for abdominal pain, blood in stool and vomiting. Genitourinary: Negative for dysuria. Musculoskeletal: Negative for arthralgias and back pain. Skin: Negative for color change. Neurological: Negative for dizziness, weakness, numbness and headaches. Psychiatric/Behavioral: Positive for sleep disturbance. Negative for hallucinations, self-injury and suicidal ideas. The patient is nervous/anxious. Physical Exam     Physical Exam  Vitals signs and nursing note reviewed. Constitutional:       Appearance: Normal appearance. He is normal weight. HENT:      Head: Normocephalic and atraumatic. Nose: Nose normal.      Mouth/Throat:      Mouth: Mucous membranes are moist.   Eyes:      Extraocular Movements: Extraocular movements intact. Pupils: Pupils are equal, round, and reactive to light. Neck:      Musculoskeletal: Normal range of motion and neck supple. Cardiovascular:      Rate and Rhythm: Normal rate and regular rhythm. Pulses: Normal pulses. Heart sounds: Normal heart sounds. Pulmonary:      Breath sounds: Normal breath sounds. Abdominal:      General: Abdomen is flat. Bowel sounds are normal.      Palpations: Abdomen is soft. Musculoskeletal: Normal range of motion. General: No swelling or tenderness. Skin:     General: Skin is warm and dry. Capillary Refill: Capillary refill takes less than 2 seconds. Neurological:      General: No focal deficit present. Mental Status: He is alert and oriented to person, place, and time. Cranial Nerves: No cranial nerve deficit. Sensory: No sensory deficit. Motor: No weakness. Psychiatric:         Mood and Affect: Mood normal.         Behavior: Behavior normal.         Thought Content:  Thought content normal.         Judgment: Judgment normal.         Diagnostic Study Results     Labs -     Recent Results (from the past 12 hour(s))   CBC WITH AUTOMATED DIFF Collection Time: 03/05/21  2:30 PM   Result Value Ref Range    WBC 9.6 4.1 - 11.1 K/uL    RBC 4.68 4.10 - 5.70 M/uL    HGB 14.6 12.1 - 17.0 g/dL    HCT 44.0 36.6 - 50.3 %    MCV 94.0 80.0 - 99.0 FL    MCH 31.2 26.0 - 34.0 PG    MCHC 33.2 30.0 - 36.5 g/dL    RDW 12.8 11.5 - 14.5 %    PLATELET 556 130 - 322 K/uL    MPV 12.8 8.9 - 12.9 FL    NEUTROPHILS 69 32 - 75 %    LYMPHOCYTES 24 12 - 49 %    MONOCYTES 7 5 - 13 %    EOSINOPHILS 0 0 - 7 %    BASOPHILS 0 0 - 1 %    IMMATURE GRANULOCYTES 0 0.0 - 0.5 %    ABS. NEUTROPHILS 6.6 1.8 - 8.0 K/UL    ABS. LYMPHOCYTES 2.3 0.8 - 3.5 K/UL    ABS. MONOCYTES 0.7 0.0 - 1.0 K/UL    ABS. EOSINOPHILS 0.0 0.0 - 0.4 K/UL    ABS. BASOPHILS 0.0 0.0 - 0.1 K/UL    ABS. IMM. GRANS. 0.0 0.00 - 0.04 K/UL    DF AUTOMATED     METABOLIC PANEL, COMPREHENSIVE    Collection Time: 03/05/21  2:30 PM   Result Value Ref Range    Sodium 141 136 - 145 mmol/L    Potassium 3.4 (L) 3.5 - 5.1 mmol/L    Chloride 105 97 - 108 mmol/L    CO2 28 21 - 32 mmol/L    Anion gap 8 5 - 15 mmol/L    Glucose 88 65 - 100 mg/dL    BUN 10 6 - 20 mg/dL    Creatinine 0.95 0.70 - 1.30 mg/dL    BUN/Creatinine ratio 11 (L) 12 - 20      GFR est AA >60 >60 ml/min/1.73m2    GFR est non-AA >60 >60 ml/min/1.73m2    Calcium 9.9 8.5 - 10.1 mg/dL    Bilirubin, total 0.5 0.2 - 1.0 mg/dL    AST (SGOT) 20 15 - 37 U/L    ALT (SGPT) 27 12 - 78 U/L    Alk.  phosphatase 68 45 - 117 U/L    Protein, total 8.9 (H) 6.4 - 8.2 g/dL    Albumin 4.7 3.5 - 5.0 g/dL    Globulin 4.2 (H) 2.0 - 4.0 g/dL    A-G Ratio 1.1 1.1 - 2.2     URINALYSIS W/ REFLEX CULTURE    Collection Time: 03/05/21  2:30 PM    Specimen: Urine   Result Value Ref Range    Color Yellow/Straw      Appearance Clear Clear      Specific gravity 1.027 1.003 - 1.030      pH (UA) 5.0 5.0 - 8.0      Protein 30 (A) Negative mg/dL    Glucose Negative Negative mg/dL    Ketone 20 (A) Negative mg/dL    Bilirubin Negative Negative      Blood Negative Negative      Urobilinogen 4.0 (H) 0.1 - 1.0 EU/dL Nitrites Negative Negative      Leukocyte Esterase Negative Negative      UA:UC IF INDICATED Culture not indicated by UA result Culture not indicated by UA result      WBC 0-4 0 - 4 /hpf    RBC 0-5 0 - 5 /hpf    Bacteria Negative Negative /hpf    Mucus 4+ /lpf   DRUG SCREEN, URINE    Collection Time: 03/05/21  2:30 PM   Result Value Ref Range    AMPHETAMINES Negative Negative      BARBITURATES Negative Negative      BENZODIAZEPINES Negative Negative      COCAINE Negative Negative      METHADONE Negative Negative      OPIATES Negative Negative      PCP(PHENCYCLIDINE) Negative Negative      THC (TH-CANNABINOL) Negative Negative      Drug screen comment        This test is a screen for drugs of abuse in a medical setting only (i.e., they are unconfirmed results and as such must not be used for non-medical purposes, e.g.,employment testing, legal testing). Due to its inherent nature, false positive (FP) and false negative (FN) results may be obtained. Therefore, if necessary for medical care, recommend confirmation of positive findings by GC/MS. ETHYL ALCOHOL    Collection Time: 03/05/21  2:30 PM   Result Value Ref Range    ALCOHOL(ETHYL),SERUM <4 <10 mg/dL   SARS-COV-2    Collection Time: 03/05/21  2:50 PM   Result Value Ref Range    SARS-CoV-2 Please find results under separate order     SARS-COV-2    Collection Time: 03/05/21  2:50 PM   Result Value Ref Range    SARS-CoV-2 Not Detected Not Detected         Radiologic Studies -   [unfilled]  CT Results  (Last 48 hours)    None        CXR Results  (Last 48 hours)    None          Medical Decision Making and ED Course   I am the first provider for this patient. I reviewed the vital signs, available nursing notes, past medical history, past surgical history, family history and social history. Vital Signs-Reviewed the patient's vital signs.   Patient Vitals for the past 12 hrs:   Temp Pulse Resp BP SpO2   03/05/21 1412 97.8 °F (36.6 °C) 79 16 130/81 97 % EKG interpretation: (Preliminary)  Normal sinus rhythm 67, T wave inversions in lateral leads, unchanged from previous EKG. No ST elevations    Records Reviewed: Nursing Notes    The patient presents with anxiety, abdominal pain, chest pain with a differential diagnosis of anxiety reaction, gastroenteritis, arrhythmia, ACS      Provider Notes (Medical Decision Making):     MDM   79-year-old male, history of anxiety, depression, currently is not taking any medications for, presents emergency department for worsening anxiety after recent diagnosis of mesenteric lymphadenitis. Physical exam shows well-appearing male, no distress, stable vitals, abdomen soft nontender nondistended. Basic lab work drawn, will contact behavioral health to come briefly evaluate patient at this time patient has no signs or symptoms of acute psychosis, SI/HI. ED Course:   Initial assessment performed. The patients presenting problems have been discussed, and they are in agreement with the care plan formulated and outlined with them. I have encouraged them to ask questions as they arise throughout their visit. ED Course as of Mar 05 2240   Fri Mar 05, 2021   1644 Patient evaluated by behavioral health, no acute risk, reference is given to patient, reviewed, metabolic panel CBC grossly normal, will discharge patient home. [PZ]      ED Course User Index  [PZ] Eli Haney MD         Procedures       Shai Gupta MD  Procedures           Shai Gupta MD        Disposition     Discharge      Remove if not discharged  DISCHARGE PLAN:  1. There are no discharge medications for this patient.     2.   Follow-up Information     Follow up With Specialties Details Why Contact Shaggy Moreland MD Pediatric Medicine In 1 week As needed [address] [id number] EMERGENCY DEPT Emergency Medicine  If symptoms worsen [address] [address]  [phone] 3.  Return to ED if worse     Diagnosis     Clinical Impression:   1. Anxiety reaction        Attestations:    Ramsey Kidd MD    Please note that this dictation was completed with [employer], the computer voice recognition software. Quite often unanticipated grammatical, syntax, homophones, and other interpretive errors are inadvertently transcribed by the computer software. Please disregard these errors. Please excuse any errors that have escaped final proofreading. Thank you.

## 2021-03-05 NOTE — ED NOTES
SBIRT CONSULT      REASON FOR REFERRAL   Pt presented to the ED for insomnia and anxiety. He was referred to SBIRT for resources and consultation. MENTAL HEALTH STATUS  During consult, pt presented in a hospital gown and was laying in ED bed in room 16. He was A&O x4. Pt was polite and cooperative. [facility] [place] who appeared his stated age of 21years old. He maintained appropriate eye contact and responded to questions in a manner suggestive of his ability to comprehend questions posed of him. Pt's thought process and content were generally clear, coherent and goal directed. Pt indicated no issues with memory or focus. Pt reported issues with sleeping and eating due to anxiousness. He denied SI/HI. He denied AVH. He reported to being \"tense all of the time\" and \"obsessing\" over things. SCREENING TOOLS  Pt participated in a PHQ-9 he scored a 10 on the PHQ-9 indicating that he is experiencing some mild depressive symptoms. BRIEF INTERVENTION   Pt was receptive towards a brief intervention to discuss his anxiety and depression. He was receptive towards reviewing some relaxation techniques such as deep breathing and positive self talk. We also discussed positive distractions and supports that can assist when he feels that he may be going into a panic attack. Pt was receptive towards resources. DISPOSITION    Pt was provided with resources for outpatient therapy and Psychiatry services. He was initially referred to his PCP for medication but he indicated that his PCP already referred him to Psychiatry. RECOMMENDATION   It is recommended that Pt follow up with Outpatient therapy.          Saira Fuchs [facility], OT   SBIRT Clinician

## 2021-03-05 NOTE — ED TRIAGE NOTES
Poor PO intake, insomnia, and fear of dying. Unable to relax, constant anxiety due to fear of dying one day. Pt reports Rx for anxiety meds, denies taking meds due to fear of SI as a side effect of medication. Voluntary. Anxious, restless, cooperative in triage.

## 2021-03-05 NOTE — ED NOTES
Patient discharged home following routine work found to to be within defined limits. Normal vital signs. Reviewed follow up instructions with patient. Extensive conversation regarding return precautions discussed. Patient acknowledged understanding. All personal belongings taken by patient.

## 2021-03-06 LAB
ATRIAL RATE: 67 BPM
CALCULATED P AXIS, ECG09: 3 DEGREES
CALCULATED R AXIS, ECG10: 37 DEGREES
CALCULATED T AXIS, ECG11: 161 DEGREES
DIAGNOSIS, 93000: NORMAL
P-R INTERVAL, ECG05: 148 MS
Q-T INTERVAL, ECG07: 424 MS
QRS DURATION, ECG06: 90 MS
QTC CALCULATION (BEZET), ECG08: 448 MS
VENTRICULAR RATE, ECG03: 67 BPM

## 2021-04-26 ENCOUNTER — HOSPITAL ENCOUNTER (EMERGENCY)
Age: 21
Discharge: HOME | End: 2021-04-26
Attending: EMERGENCY MEDICINE
Payer: MEDICAID

## 2021-04-26 ENCOUNTER — APPOINTMENT (OUTPATIENT)
Dept: GENERAL RADIOLOGY | Age: 21
End: 2021-04-26
Attending: EMERGENCY MEDICINE
Payer: MEDICAID

## 2021-04-26 VITALS
HEIGHT: 71 IN | OXYGEN SATURATION: 96 % | HEART RATE: 69 BPM | DIASTOLIC BLOOD PRESSURE: 70 MMHG | RESPIRATION RATE: 16 BRPM | BODY MASS INDEX: 32.2 KG/M2 | WEIGHT: 230 LBS | SYSTOLIC BLOOD PRESSURE: 124 MMHG | TEMPERATURE: 99.2 F

## 2021-04-26 DIAGNOSIS — F41.1 ANXIETY STATE: ICD-10-CM

## 2021-04-26 DIAGNOSIS — R07.89 CHEST WALL PAIN: Primary | ICD-10-CM

## 2021-04-26 PROCEDURE — 71045 X-RAY EXAM CHEST 1 VIEW: CPT

## 2021-04-26 PROCEDURE — 99282 EMERGENCY DEPT VISIT SF MDM: CPT

## 2021-04-26 PROCEDURE — 93005 ELECTROCARDIOGRAM TRACING: CPT

## 2021-04-26 RX ORDER — OMEPRAZOLE 40 MG/1
CAPSULE, DELAYED RELEASE ORAL
COMMUNITY
Start: 2021-03-17 | End: 2021-07-18

## 2021-04-26 RX ORDER — FLUTICASONE PROPIONATE 50 MCG
SPRAY, SUSPENSION (ML) NASAL
COMMUNITY
Start: 2021-04-22 | End: 2021-07-18

## 2021-04-26 RX ORDER — IBUPROFEN 200 MG
TABLET ORAL
COMMUNITY
Start: 2021-03-17 | End: 2021-07-18

## 2021-04-26 NOTE — ED TRIAGE NOTES
Pt c/o intermittent left sided chest pain x2-3 months. States gets SOB and general weakness with episodes. Feels nervous and anxious. Pt states under a lot of stress.   Has appt 5/4 for PCP check

## 2021-04-26 NOTE — ED PROVIDER NOTES
EMERGENCY DEPARTMENT HISTORY AND PHYSICAL EXAM      Date: 4/26/2021  Patient Name: Amy Velasquez    History of Presenting Illness     Chief Complaint   Patient presents with    Chest Pain    Anxiety       History Provided By: Patient    HPI: Amy Velasquez, 21 y.o. male   presents to the ED with cc of chest pain. Patient complains of intermittent episode of left sided lateral chest pain for last several weeks. The pain is described as sharp and stabbing lasting several minutes at a time without radiation. No obvious precipitating, aggravating or alleviating factors. Patient admits of generalized anxiety for last several weeks. Patient has seen a cardiologist for first time last week a follow-up appointment next week for further evaluation. Patient states that he was not given a definitive diagnosis. No cough. No fever chills. PCP: Martha Garcia MD    No current facility-administered medications on file prior to encounter. Current Outpatient Medications on File Prior to Encounter   Medication Sig Dispense Refill    fluticasone propionate (FLONASE) 50 mcg/actuation nasal spray       omeprazole (PRILOSEC) 40 mg capsule TAKE 1 CAPSULE BY MOUTH EVERY DAY      Gas Relief Extra Strength 125 mg capsule TAKE 1 CAPSULE BY MOUTH THREE TIMES A DAY         Past History     Past Medical History:  Past Medical History:   Diagnosis Date    Anxiety     GERD (gastroesophageal reflux disease)     Psychiatric disorder     Anxiety, Depression       Past Surgical History:  History reviewed. No pertinent surgical history. Family History:  History reviewed. No pertinent family history.     Social History:  Social History     Tobacco Use    Smoking status: Never Smoker    Smokeless tobacco: Never Used   Substance Use Topics    Alcohol use: Never     Frequency: Never    Drug use: Never       Allergies:  No Known Allergies      Review of Systems   Review of Systems   Constitutional: Negative for chills and fever. HENT: Negative for sore throat. Eyes: Negative for discharge. Respiratory: Negative for cough. Cardiovascular: Positive for chest pain. Gastrointestinal: Negative for abdominal pain. Genitourinary: Negative for difficulty urinating. Musculoskeletal: Negative for arthralgias. Skin: Negative for rash. Neurological: Negative for headaches. Psychiatric/Behavioral: The patient is nervous/anxious. All other systems reviewed and are negative. Physical Exam   Physical Exam  Vitals signs and nursing note reviewed. Constitutional:       Appearance: Normal appearance. HENT:      Head: Normocephalic and atraumatic. Nose: No congestion. Mouth/Throat:      Mouth: Mucous membranes are moist.   Eyes:      General: No scleral icterus. Conjunctiva/sclera: Conjunctivae normal.   Neck:      Musculoskeletal: Neck supple. No muscular tenderness. Cardiovascular:      Rate and Rhythm: Normal rate and regular rhythm. Heart sounds: Normal heart sounds. Comments: Left lateral chest mildly tender. Pulmonary:      Effort: Pulmonary effort is normal.      Breath sounds: Normal breath sounds. Abdominal:      General: Abdomen is flat. Bowel sounds are normal.      Palpations: Abdomen is soft. Musculoskeletal:         General: No swelling. Right lower leg: No edema. Left lower leg: No edema. Lymphadenopathy:      Cervical: No cervical adenopathy. Skin:     General: Skin is warm and dry. Neurological:      General: No focal deficit present. Mental Status: He is alert. Psychiatric:         Mood and Affect: Mood normal.         Diagnostic Study Results     Labs -   No results found for this or any previous visit (from the past 12 hour(s)).     Radiologic Studies -   XR CHEST PORT    (Results Pending)     CT Results  (Last 48 hours)    None        CXR Results  (Last 48 hours)    None            Medical Decision Making   I am the first provider for this patient. I reviewed the vital signs, available nursing notes, past medical history, past surgical history, family history and social history. Vital Signs-Reviewed the patient's vital signs. Patient Vitals for the past 12 hrs:   Temp Pulse Resp BP SpO2   04/26/21 1916 99.2 °F (37.3 °C) 79 16 136/72 96 %       Records Reviewed:     Provider Notes (Medical Decision Making):       ED Course:   Initial assessment performed. The patients presenting problems have been discussed, and they are in agreement with the care plan formulated and outlined with them. I have encouraged them to ask questions as they arise throughout their visit. ED Course as of Apr 26 1933   Mon Apr 26, 2021 1932 EKG normal sinus rhythm normal QRS QT LVH with inverted T waves. No STEMI.    [SK]      ED Course User Index  [SK] Moose Andres MD     I discussed with the patient with the EKG findings that suggest a possibility of IHSS. I discussed with the patient about the importance of follow-up with cardiologist for further evaluation. Patient is not involved in any sports activity currently. PROCEDURES      Disposition: Condition stable   DC- Adult Discharges: All of the diagnostic tests were reviewed and questions answered. Diagnosis, care plan and treatment options were discussed. understand instructions and will follow up as directed. The patients results have been reviewed with them. They have been counseled regarding their diagnosis. The patient verbally convey understanding and agreement of the signs, symptoms, diagnosis, treatment and prognosis and additionally agrees to follow up as recommended. They also agree with the care-plan and convey that all of their questions have been answered.   I have also put together some discharge instructions for them that include: 1) educational information regarding their diagnosis, 2) how to care for their diagnosis at home, as well a 3) list of reasons why they would want to return to the ED prior to their follow-up appointment, should their condition change. PLAN:  1. Current Discharge Medication List        2. Follow-up Information     Follow up With Specialties Details Why Contact Info    Follow up with your primary care physician  Schedule an appointment as soon as possible for a visit in 3 days As needed     Follow up with your primary care physician  Schedule an appointment as soon as possible for a visit in 3 days As needed         Return to ED if worse     Diagnosis     Clinical Impression:   1. Chest wall pain    2. Anxiety state        Please note that this dictation was completed with [employer], the computer voice recognition software. Quite often unanticipated grammatical, syntax, homophones, and other interpretive errors are inadvertently transcribed by the computer software. Please disregard these errors. Please excuse any errors that have escaped final proofreading. Thank you.

## 2021-04-27 LAB
ATRIAL RATE: 65 BPM
CALCULATED P AXIS, ECG09: 7 DEGREES
CALCULATED R AXIS, ECG10: 50 DEGREES
CALCULATED T AXIS, ECG11: -107 DEGREES
DIAGNOSIS, 93000: NORMAL
P-R INTERVAL, ECG05: 144 MS
Q-T INTERVAL, ECG07: 406 MS
QRS DURATION, ECG06: 86 MS
QTC CALCULATION (BEZET), ECG08: 422 MS
VENTRICULAR RATE, ECG03: 65 BPM

## 2021-07-18 ENCOUNTER — HOSPITAL ENCOUNTER (EMERGENCY)
Age: 21
Discharge: HOME | End: 2021-07-18
Attending: FAMILY MEDICINE
Payer: MEDICAID

## 2021-07-18 VITALS
HEART RATE: 66 BPM | WEIGHT: 240 LBS | OXYGEN SATURATION: 99 % | RESPIRATION RATE: 18 BRPM | HEIGHT: 72 IN | SYSTOLIC BLOOD PRESSURE: 114 MMHG | TEMPERATURE: 98.6 F | DIASTOLIC BLOOD PRESSURE: 79 MMHG | BODY MASS INDEX: 32.51 KG/M2

## 2021-07-18 DIAGNOSIS — K52.9 ENTERITIS: Primary | ICD-10-CM

## 2021-07-18 LAB
ALBUMIN SERPL-MCNC: 4.3 G/DL (ref 3.5–5)
ALBUMIN/GLOB SERPL: 1.1 {RATIO} (ref 1.1–2.2)
ALP SERPL-CCNC: 58 U/L (ref 45–117)
ALT SERPL-CCNC: 26 U/L (ref 12–78)
ANION GAP SERPL CALC-SCNC: 9 MMOL/L (ref 5–15)
AST SERPL W P-5'-P-CCNC: 18 U/L (ref 15–37)
BASOPHILS # BLD: 0 K/UL (ref 0–0.1)
BASOPHILS NFR BLD: 0 % (ref 0–1)
BILIRUB SERPL-MCNC: 0.5 MG/DL (ref 0.2–1)
BUN SERPL-MCNC: 9 MG/DL (ref 6–20)
BUN/CREAT SERPL: 10 (ref 12–20)
CA-I BLD-MCNC: 9 MG/DL (ref 8.5–10.1)
CHLORIDE SERPL-SCNC: 106 MMOL/L (ref 97–108)
CO2 SERPL-SCNC: 28 MMOL/L (ref 21–32)
CREAT SERPL-MCNC: 0.88 MG/DL (ref 0.7–1.3)
DIFFERENTIAL METHOD BLD: NORMAL
EOSINOPHIL # BLD: 0 K/UL (ref 0–0.4)
EOSINOPHIL NFR BLD: 0 % (ref 0–7)
ERYTHROCYTE [DISTWIDTH] IN BLOOD BY AUTOMATED COUNT: 12.3 % (ref 11.5–14.5)
GLOBULIN SER CALC-MCNC: 3.9 G/DL (ref 2–4)
GLUCOSE SERPL-MCNC: 98 MG/DL (ref 65–100)
HCT VFR BLD AUTO: 40.2 % (ref 36.6–50.3)
HGB BLD-MCNC: 13.5 G/DL (ref 12.1–17)
IMM GRANULOCYTES # BLD AUTO: 0 K/UL (ref 0–0.04)
IMM GRANULOCYTES NFR BLD AUTO: 0 % (ref 0–0.5)
LIPASE SERPL-CCNC: 61 U/L (ref 73–393)
LYMPHOCYTES # BLD: 2.1 K/UL (ref 0.8–3.5)
LYMPHOCYTES NFR BLD: 30 % (ref 12–49)
MCH RBC QN AUTO: 31.3 PG (ref 26–34)
MCHC RBC AUTO-ENTMCNC: 33.6 G/DL (ref 30–36.5)
MCV RBC AUTO: 93.1 FL (ref 80–99)
MONOCYTES # BLD: 0.5 K/UL (ref 0–1)
MONOCYTES NFR BLD: 7 % (ref 5–13)
NEUTS SEG # BLD: 4.3 K/UL (ref 1.8–8)
NEUTS SEG NFR BLD: 63 % (ref 32–75)
PLATELET # BLD AUTO: 193 K/UL (ref 150–400)
PMV BLD AUTO: 11.7 FL (ref 8.9–12.9)
POTASSIUM SERPL-SCNC: 3.7 MMOL/L (ref 3.5–5.1)
PROT SERPL-MCNC: 8.2 G/DL (ref 6.4–8.2)
RBC # BLD AUTO: 4.32 M/UL (ref 4.1–5.7)
SODIUM SERPL-SCNC: 143 MMOL/L (ref 136–145)
WBC # BLD AUTO: 7 K/UL (ref 4.1–11.1)

## 2021-07-18 PROCEDURE — 83690 ASSAY OF LIPASE: CPT

## 2021-07-18 PROCEDURE — 80053 COMPREHEN METABOLIC PANEL: CPT

## 2021-07-18 PROCEDURE — 36415 COLL VENOUS BLD VENIPUNCTURE: CPT

## 2021-07-18 PROCEDURE — 85025 COMPLETE CBC W/AUTO DIFF WBC: CPT

## 2021-07-18 PROCEDURE — 74011250636 HC RX REV CODE- 250/636: Performed by: FAMILY MEDICINE

## 2021-07-18 PROCEDURE — 74011250637 HC RX REV CODE- 250/637: Performed by: FAMILY MEDICINE

## 2021-07-18 PROCEDURE — 99283 EMERGENCY DEPT VISIT LOW MDM: CPT

## 2021-07-18 PROCEDURE — 96360 HYDRATION IV INFUSION INIT: CPT

## 2021-07-18 RX ORDER — LOPERAMIDE HYDROCHLORIDE 2 MG/1
2 CAPSULE ORAL
Qty: 20 CAPSULE | Refills: 0 | Status: SHIPPED | OUTPATIENT
Start: 2021-07-18 | End: 2021-07-28

## 2021-07-18 RX ORDER — LOPERAMIDE HYDROCHLORIDE 2 MG/1
4 CAPSULE ORAL ONCE
Status: COMPLETED | OUTPATIENT
Start: 2021-07-18 | End: 2021-07-18

## 2021-07-18 RX ADMIN — SODIUM CHLORIDE 1000 ML: 9 INJECTION, SOLUTION INTRAVENOUS at 14:00

## 2021-07-18 RX ADMIN — LOPERAMIDE HYDROCHLORIDE 4 MG: 2 CAPSULE ORAL at 14:01

## 2021-07-18 NOTE — DISCHARGE INSTRUCTIONS
Thank you! Thank you for allowing me to care for you in the emergency department. I sincerely hope that you are satisfied with your visit today. It is my goal to provide you with excellent care. Below you will find a list of your labs and imaging from your visit today. Should you have any questions regarding these results please do not hesitate to call the emergency department. Labs -     Recent Results (from the past 12 hour(s))   CBC WITH AUTOMATED DIFF    Collection Time: 07/18/21  1:24 PM   Result Value Ref Range    WBC 7.0 4.1 - 11.1 K/uL    RBC 4.32 4.10 - 5.70 M/uL    HGB 13.5 12.1 - 17.0 g/dL    HCT 40.2 36.6 - 50.3 %    MCV 93.1 80.0 - 99.0 FL    MCH 31.3 26.0 - 34.0 PG    MCHC 33.6 30.0 - 36.5 g/dL    RDW 12.3 11.5 - 14.5 %    PLATELET 044 146 - 214 K/uL    MPV 11.7 8.9 - 12.9 FL    NEUTROPHILS 63 32 - 75 %    LYMPHOCYTES 30 12 - 49 %    MONOCYTES 7 5 - 13 %    EOSINOPHILS 0 0 - 7 %    BASOPHILS 0 0 - 1 %    IMMATURE GRANULOCYTES 0 0.0 - 0.5 %    ABS. NEUTROPHILS 4.3 1.8 - 8.0 K/UL    ABS. LYMPHOCYTES 2.1 0.8 - 3.5 K/UL    ABS. MONOCYTES 0.5 0.0 - 1.0 K/UL    ABS. EOSINOPHILS 0.0 0.0 - 0.4 K/UL    ABS. BASOPHILS 0.0 0.0 - 0.1 K/UL    ABS. IMM. GRANS. 0.0 0.00 - 0.04 K/UL    DF AUTOMATED     METABOLIC PANEL, COMPREHENSIVE    Collection Time: 07/18/21  1:24 PM   Result Value Ref Range    Sodium 143 136 - 145 mmol/L    Potassium 3.7 3.5 - 5.1 mmol/L    Chloride 106 97 - 108 mmol/L    CO2 28 21 - 32 mmol/L    Anion gap 9 5 - 15 mmol/L    Glucose 98 65 - 100 mg/dL    BUN 9 6 - 20 mg/dL    Creatinine 0.88 0.70 - 1.30 mg/dL    BUN/Creatinine ratio 10 (L) 12 - 20      GFR est AA >60 >60 ml/min/1.73m2    GFR est non-AA >60 >60 ml/min/1.73m2    Calcium 9.0 8.5 - 10.1 mg/dL    Bilirubin, total 0.5 0.2 - 1.0 mg/dL    AST (SGOT) 18 15 - 37 U/L    ALT (SGPT) 26 12 - 78 U/L    Alk.  phosphatase 58 45 - 117 U/L    Protein, total 8.2 6.4 - 8.2 g/dL    Albumin 4.3 3.5 - 5.0 g/dL    Globulin 3.9 2.0 - 4.0 g/dL A-G Ratio 1.1 1.1 - 2.2     LIPASE    Collection Time: 07/18/21  1:24 PM   Result Value Ref Range    Lipase 61 (L) 73 - 393 U/L       Radiologic Studies -   No orders to display     CT Results  (Last 48 hours)      None          CXR Results  (Last 48 hours)      None               If you feel that you have not received excellent quality care or timely care, please ask to speak to the nurse manager. Please choose us in the future for your continued health care needs. ------------------------------------------------------------------------------------------------------------  The exam and treatment you received in the Emergency Department were for an urgent problem and are not intended as complete care. It is important that you follow-up with a doctor, nurse practitioner, or physician assistant to:  (1) confirm your diagnosis,  (2) re-evaluation of changes in your illness and treatment, and  (3) for ongoing care. If your symptoms become worse or you do not improve as expected and you are unable to reach your usual health care provider, you should return to the Emergency Department. We are available 24 hours a day. Please take your discharge instructions with you when you go to your follow-up appointment. If you have any problem arranging a follow-up appointment, contact the Emergency Department immediately. If a prescription has been provided, please have it filled as soon as possible to prevent a delay in treatment. Read the entire medication instruction sheet provided to you by the pharmacy. If you have any questions or reservations about taking the medication due to side effects or interactions with other medications, please call your primary care physician or contact the ER to speak with the charge nurse. Make an appointment with your family doctor or the physician you were referred to for follow-up of this visit as instructed on your discharge paperwork, as this is a mandatory follow-up.  Return to the ER if you are unable to be seen or if you are unable to be seen in a timely manner. If you have any problem arranging the follow-up visit, contact the Emergency Department immediately.

## 2021-07-18 NOTE — ED PROVIDER NOTES
EMERGENCY DEPARTMENT HISTORY AND PHYSICAL EXAM      Date: 7/18/2021  Patient Name: Juvencio Romeo    History of Presenting Illness     Chief Complaint   Patient presents with    Abdominal Pain    Diarrhea       History Provided By: Patient    HPI: Juvencio Romeo, 21 y.o. male presents to the ED with cc of diarrhea and generalized abdominal pain. Began 1 week ago. Described as a intermittent achy pain, nonradiating. Usually get worse shortly after he eats a meal followed by nonbloody watery diarrhea. At times it would subside until he eats again. No nausea vomiting. No fever body aches chills. Prior history was diagnosed with mesenteric adenitis and symptoms today feel similar. No OTC treatment. No travel or takeout food. There are no other complaints, changes, or physical findings at this time. PCP: Kerline Ellsworth MD    No current facility-administered medications on file prior to encounter. Current Outpatient Medications on File Prior to Encounter   Medication Sig Dispense Refill    [DISCONTINUED] fluticasone propionate (FLONASE) 50 mcg/actuation nasal spray       [DISCONTINUED] omeprazole (PRILOSEC) 40 mg capsule TAKE 1 CAPSULE BY MOUTH EVERY DAY      [DISCONTINUED] Gas Relief Extra Strength 125 mg capsule TAKE 1 CAPSULE BY MOUTH THREE TIMES A DAY         Past History     Past Medical History:  Past Medical History:   Diagnosis Date    Anxiety     GERD (gastroesophageal reflux disease)     Psychiatric disorder     Anxiety, Depression       Past Surgical History:  No past surgical history on file. Family History:  History reviewed. No pertinent family history. Social History:  Social History     Tobacco Use    Smoking status: Never Smoker    Smokeless tobacco: Never Used   Substance Use Topics    Alcohol use: Never    Drug use: Never       Allergies:  No Known Allergies      Review of Systems     Review of Systems   Constitutional: Negative for chills and fever.    HENT: Negative for congestion and sore throat. Eyes: Negative for photophobia and visual disturbance. Respiratory: Negative for cough and shortness of breath. Cardiovascular: Negative for chest pain and palpitations. Gastrointestinal: Positive for abdominal pain and diarrhea. Negative for nausea and vomiting. Genitourinary: Negative for dysuria and flank pain. Musculoskeletal: Negative for arthralgias, back pain and myalgias. Skin: Negative for rash and wound. Allergic/Immunologic: Negative for environmental allergies and food allergies. Neurological: Negative for light-headedness and headaches. All other systems reviewed and are negative. Physical Exam     Physical Exam  Vitals and nursing note reviewed. Constitutional:       Appearance: Normal appearance. He is normal weight. HENT:      Head: Normocephalic and atraumatic. Eyes:      Extraocular Movements: Extraocular movements intact. Conjunctiva/sclera: Conjunctivae normal.   Cardiovascular:      Rate and Rhythm: Normal rate and regular rhythm. Pulses: Normal pulses. Heart sounds: Normal heart sounds. Pulmonary:      Effort: Pulmonary effort is normal.      Breath sounds: Normal breath sounds. Abdominal:      General: Abdomen is flat. Bowel sounds are normal. There is no distension. Palpations: Abdomen is soft. Tenderness: There is generalized abdominal tenderness (mild). There is no right CVA tenderness, left CVA tenderness or guarding. Negative signs include Kirk's sign and McBurney's sign. Musculoskeletal:         General: No swelling. Normal range of motion. Skin:     General: Skin is warm. Capillary Refill: Capillary refill takes less than 2 seconds. Neurological:      General: No focal deficit present. Mental Status: He is alert and oriented to person, place, and time.    Psychiatric:         Mood and Affect: Mood normal.         Behavior: Behavior normal.         Thought Content: Thought content normal.         Judgment: Judgment normal.         Lab and Diagnostic Study Results     Labs -     Recent Results (from the past 12 hour(s))   CBC WITH AUTOMATED DIFF    Collection Time: 07/18/21  1:24 PM   Result Value Ref Range    WBC 7.0 4.1 - 11.1 K/uL    RBC 4.32 4.10 - 5.70 M/uL    HGB 13.5 12.1 - 17.0 g/dL    HCT 40.2 36.6 - 50.3 %    MCV 93.1 80.0 - 99.0 FL    MCH 31.3 26.0 - 34.0 PG    MCHC 33.6 30.0 - 36.5 g/dL    RDW 12.3 11.5 - 14.5 %    PLATELET 687 343 - 182 K/uL    MPV 11.7 8.9 - 12.9 FL    NEUTROPHILS 63 32 - 75 %    LYMPHOCYTES 30 12 - 49 %    MONOCYTES 7 5 - 13 %    EOSINOPHILS 0 0 - 7 %    BASOPHILS 0 0 - 1 %    IMMATURE GRANULOCYTES 0 0.0 - 0.5 %    ABS. NEUTROPHILS 4.3 1.8 - 8.0 K/UL    ABS. LYMPHOCYTES 2.1 0.8 - 3.5 K/UL    ABS. MONOCYTES 0.5 0.0 - 1.0 K/UL    ABS. EOSINOPHILS 0.0 0.0 - 0.4 K/UL    ABS. BASOPHILS 0.0 0.0 - 0.1 K/UL    ABS. IMM. GRANS. 0.0 0.00 - 0.04 K/UL    DF AUTOMATED     METABOLIC PANEL, COMPREHENSIVE    Collection Time: 07/18/21  1:24 PM   Result Value Ref Range    Sodium 143 136 - 145 mmol/L    Potassium 3.7 3.5 - 5.1 mmol/L    Chloride 106 97 - 108 mmol/L    CO2 28 21 - 32 mmol/L    Anion gap 9 5 - 15 mmol/L    Glucose 98 65 - 100 mg/dL    BUN 9 6 - 20 mg/dL    Creatinine 0.88 0.70 - 1.30 mg/dL    BUN/Creatinine ratio 10 (L) 12 - 20      GFR est AA >60 >60 ml/min/1.73m2    GFR est non-AA >60 >60 ml/min/1.73m2    Calcium 9.0 8.5 - 10.1 mg/dL    Bilirubin, total 0.5 0.2 - 1.0 mg/dL    AST (SGOT) 18 15 - 37 U/L    ALT (SGPT) 26 12 - 78 U/L    Alk.  phosphatase 58 45 - 117 U/L    Protein, total 8.2 6.4 - 8.2 g/dL    Albumin 4.3 3.5 - 5.0 g/dL    Globulin 3.9 2.0 - 4.0 g/dL    A-G Ratio 1.1 1.1 - 2.2     LIPASE    Collection Time: 07/18/21  1:24 PM   Result Value Ref Range    Lipase 61 (L) 73 - 393 U/L       Radiologic Studies -   @lastxrresult@  CT Results  (Last 48 hours)    None        CXR Results  (Last 48 hours)    None            Medical Decision Making - I am the first provider for this patient. - I reviewed the vital signs, available nursing notes, past medical history, past surgical history, family history and social history. - Initial assessment performed. The patients presenting problems have been discussed, and they are in agreement with the care plan formulated and outlined with them. I have encouraged them to ask questions as they arise throughout their visit. Vital Signs-Reviewed the patient's vital signs. Patient Vitals for the past 12 hrs:   Temp Pulse Resp BP SpO2   07/18/21 1257 98.6 °F (37 °C) 66 18 114/79 99 %       Records Reviewed: Nursing Notes    ED Course:          Provider Notes (Medical Decision Making):     MDM  Number of Diagnoses or Management Options  Risk of Complications, Morbidity, and/or Mortality  General comments: 1:56 PM  Patient is stable with no marked toxicity or distress. Per H&P, this possibly this reoccurring mesenteric adenitis or irritable bowel syndrome or food poisoning. Patient is eating drinking well. Rehydrated with a bolus of normal saline and treated with amlodipine. Recommend follow-up with GI for further evaluation for these reocurring symptoms. I reviewed all laboratory results with the patient. All questions were answered and there are no apparent barriers to comprehension or communication. The patient verbalized agreement with the diagnosis, treatment plan, and understanding of the follow-up instructions. The patient is appropriate for discharge; leaves the Emergency Department walking with a stable gait. Patient understands to return to the ED for any new or worsening symptoms. Disposition   Disposition: Condition improved  DC- Adult Discharges: All of the diagnostic tests were reviewed and questions answered. Diagnosis, care plan and treatment options were discussed. The patient understands the instructions and will follow up as directed.  The patients results have been reviewed with them.  They have been counseled regarding their diagnosis. The patient verbally convey understanding and agreement of the signs, symptoms, diagnosis, treatment and prognosis and additionally agrees to follow up as recommended with their PCP in 24 - 48 hours. They also agree with the care-plan and convey that all of their questions have been answered. I have also put together some discharge instructions for them that include: 1) educational information regarding their diagnosis, 2) how to care for their diagnosis at home, as well a 3) list of reasons why they would want to return to the ED prior to their follow-up appointment, should their condition change. Discharged    DISCHARGE PLAN:  1. Current Discharge Medication List      START taking these medications    Details   loperamide (IMODIUM) 2 mg capsule Take 1 Capsule by mouth four (4) times daily as needed for Diarrhea for up to 10 days. Qty: 20 Capsule, Refills: 0           2. Follow-up Information     Follow up With Specialties Details Why Contact Info    Sary Ryan MD Gastroenterology, Internal Medicine Schedule an appointment as soon as possible for a visit in 3 days  [address] [facility]  [phone]          3. Return to ED if worse   4. Current Discharge Medication List      START taking these medications    Details   loperamide (IMODIUM) 2 mg capsule Take 1 Capsule by mouth four (4) times daily as needed for Diarrhea for up to 10 days. Qty: 20 Capsule, Refills: 0  Start date: 7/18/2021, End date: 7/28/2021               Diagnosis     Clinical Impression:   1. Enteritis        Attestations:    Snehal Ortiz,     Please note that this dictation was completed with Outbox, the computer voice recognition software. Quite often unanticipated grammatical, syntax, homophones, and other interpretive errors are inadvertently transcribed by the computer software. Please disregard these errors.   Please excuse any errors that have escaped final proofreading. Thank you.

## 2022-01-08 ENCOUNTER — HOSPITAL ENCOUNTER (EMERGENCY)
Age: 22
Discharge: HOME | End: 2022-01-08
Attending: EMERGENCY MEDICINE
Payer: MEDICAID

## 2022-01-08 VITALS
SYSTOLIC BLOOD PRESSURE: 123 MMHG | HEIGHT: 71 IN | WEIGHT: 250 LBS | HEART RATE: 98 BPM | RESPIRATION RATE: 16 BRPM | TEMPERATURE: 98.6 F | DIASTOLIC BLOOD PRESSURE: 80 MMHG | BODY MASS INDEX: 35 KG/M2 | OXYGEN SATURATION: 98 %

## 2022-01-08 DIAGNOSIS — R10.9 ABDOMINAL CRAMPS: Primary | ICD-10-CM

## 2022-01-08 PROCEDURE — 99281 EMR DPT VST MAYX REQ PHY/QHP: CPT

## 2022-01-08 RX ORDER — DICYCLOMINE HYDROCHLORIDE 10 MG/5ML
20 SOLUTION ORAL 4 TIMES DAILY
Qty: 450 ML | Refills: 0 | Status: SHIPPED | OUTPATIENT
Start: 2022-01-08 | End: 2022-09-02

## 2022-01-08 RX ORDER — ADHESIVE BANDAGE
30 BANDAGE TOPICAL
Qty: 90 ML | Refills: 0 | Status: SHIPPED | OUTPATIENT
Start: 2022-01-08 | End: 2022-01-11

## 2022-01-08 NOTE — ED PROVIDER NOTES
EMERGENCY DEPARTMENT HISTORY AND PHYSICAL EXAM      Date: (Not on file)  Patient Name: Benito Ibrahim    History of Presenting Illness     Chief Complaint   Patient presents with    Abdominal Pain    Dizziness       History Provided By: Patient    HPI: Benito Ibrahim, 24 y.o. male with a past medical history significant No significant past medical history presents to the ED with cc of mild abdominal cramping at times. He said did not have a full bowel movement yesterday. He denies any other relieving factors but says is worse after he eats. He additionally denies any fever, chills, nausea, vomiting, chest pain, shortness of breath, rash, diarrhea, headache, night sweats. He has not treated his symptoms with anything because he says they have not been that severe. There are no other complaints, changes, or physical findings at this time. PCP: Paris Diggs, DO    No current facility-administered medications on file prior to encounter. No current outpatient medications on file prior to encounter. Past History     Past Medical History:  Past Medical History:   Diagnosis Date    Anxiety     GERD (gastroesophageal reflux disease)     Psychiatric disorder     Anxiety, Depression       Past Surgical History:  No past surgical history on file. Family History:  No family history on file. Social History:  Social History     Tobacco Use    Smoking status: Never Smoker    Smokeless tobacco: Never Used   Substance Use Topics    Alcohol use: Never    Drug use: Never       Allergies:  No Known Allergies      Review of Systems     Review of Systems   Constitutional: Negative. Negative for appetite change, chills, fatigue and fever. HENT: Negative. Negative for congestion and sinus pain. Eyes: Negative. Negative for pain and visual disturbance. Respiratory: Negative. Negative for chest tightness and shortness of breath. Cardiovascular: Negative. Negative for chest pain. Gastrointestinal: Positive for abdominal distention. Negative for abdominal pain, diarrhea, nausea and vomiting. Genitourinary: Negative. Negative for difficulty urinating. No discharge   Musculoskeletal: Negative. Negative for arthralgias. Skin: Negative. Negative for rash. Neurological: Negative. Negative for weakness and headaches. Hematological: Negative. Psychiatric/Behavioral: Negative. Negative for agitation. The patient is not nervous/anxious. All other systems reviewed and are negative. Physical Exam     Physical Exam  Vitals and nursing note reviewed. Constitutional:       General: He is not in acute distress. Appearance: He is well-developed. HENT:      Head: Normocephalic and atraumatic. Nose: Nose normal.      Mouth/Throat:      Mouth: Mucous membranes are moist.      Pharynx: Oropharynx is clear. No oropharyngeal exudate. Eyes:      General:         Right eye: No discharge. Left eye: No discharge. Conjunctiva/sclera: Conjunctivae normal.      Pupils: Pupils are equal, round, and reactive to light. Cardiovascular:      Rate and Rhythm: Normal rate and regular rhythm. Chest Wall: PMI is not displaced. No thrill. Heart sounds: Normal heart sounds. No murmur heard. No friction rub. No gallop. Pulmonary:      Effort: Pulmonary effort is normal. No respiratory distress. Breath sounds: Normal breath sounds. No wheezing or rales. Chest:      Chest wall: No tenderness. Abdominal:      General: Bowel sounds are normal. There is no distension. Palpations: Abdomen is soft. There is no mass. Tenderness: There is no abdominal tenderness. There is no guarding or rebound. Musculoskeletal:         General: Normal range of motion. Cervical back: Normal range of motion and neck supple. Lymphadenopathy:      Cervical: No cervical adenopathy. Skin:     General: Skin is warm and dry.       Capillary Refill: Capillary refill takes less than 2 seconds. Findings: No erythema or rash. Neurological:      Mental Status: He is alert and oriented to person, place, and time. Cranial Nerves: No cranial nerve deficit. Coordination: Coordination normal.   Psychiatric:         Mood and Affect: Mood normal.         Behavior: Behavior normal.         Lab and Diagnostic Study Results     Labs -   No results found for this or any previous visit (from the past 12 hour(s)). Radiologic Studies -   @lastxrresult@  CT Results  (Last 48 hours)    None        CXR Results  (Last 48 hours)    None            Medical Decision Making   - I am the first provider for this patient. - I reviewed the vital signs, available nursing notes, past medical history, past surgical history, family history and social history. - Initial assessment performed. The patients presenting problems have been discussed, and they are in agreement with the care plan formulated and outlined with them. I have encouraged them to ask questions as they arise throughout their visit. Vital Signs-Reviewed the patient's vital signs. No data found. Records Reviewed: Nursing Notes      ED Course:          Provider Notes (Medical Decision Making):   24-year-old male with mild abdominal cramping and some clinical constipation. Will treat with Bentyl as an outpatient. Physical exam is complete within normal limits at this point time. MDM       Procedures   Medical Decision Makingedical Decision Making  Performed by: Staci Cheney MD  PROCEDURES:  Procedures       Disposition   Disposition: Condition stable    Discharged    DISCHARGE PLAN:  1. There are no discharge medications for this patient. 2.   Follow-up Information     Follow up With Specialties Details Why Contact Shaggy Quintero MD Gastroenterology, Internal Medicine Call in 2 days As needed, If symptoms worsen [id number]  [phone]          3. Return to ED if worse   4. Current Discharge Medication List      START taking these medications    Details   dicyclomine (BENTYL) 10 mg/5 mL soln oral solution Take 10 mL by mouth four (4) times daily. Qty: 450 mL, Refills: 0  Start date: 1/8/2022      magnesium hydroxide (Milk of Magnesia) 400 mg/5 mL suspension Take 30 mL by mouth nightly for 3 days. Qty: 90 mL, Refills: 0  Start date: 1/8/2022, End date: 1/11/2022               Diagnosis     Clinical Impression:   1. Abdominal cramps        Attestations:    Belem Lopez MD    Please note that this dictation was completed with RooT, the computer voice recognition software. Quite often unanticipated grammatical, syntax, homophones, and other interpretive errors are inadvertently transcribed by the computer software. Please disregard these errors. Please excuse any errors that have escaped final proofreading. Thank you.

## 2022-05-05 ENCOUNTER — HOSPITAL ENCOUNTER (EMERGENCY)
Age: 22
Discharge: HOME | End: 2022-05-05
Attending: EMERGENCY MEDICINE
Payer: MEDICAID

## 2022-05-05 VITALS
HEIGHT: 71 IN | TEMPERATURE: 98.5 F | SYSTOLIC BLOOD PRESSURE: 120 MMHG | HEART RATE: 63 BPM | RESPIRATION RATE: 18 BRPM | BODY MASS INDEX: 37.8 KG/M2 | DIASTOLIC BLOOD PRESSURE: 71 MMHG | OXYGEN SATURATION: 99 % | WEIGHT: 270 LBS

## 2022-05-05 DIAGNOSIS — K52.9 GASTROENTERITIS: Primary | ICD-10-CM

## 2022-05-05 PROCEDURE — 99283 EMERGENCY DEPT VISIT LOW MDM: CPT

## 2022-05-05 PROCEDURE — 74011250637 HC RX REV CODE- 250/637: Performed by: EMERGENCY MEDICINE

## 2022-05-05 RX ORDER — ONDANSETRON 4 MG/1
4 TABLET, ORALLY DISINTEGRATING ORAL
Status: COMPLETED | OUTPATIENT
Start: 2022-05-05 | End: 2022-05-05

## 2022-05-05 RX ORDER — ONDANSETRON 4 MG/1
4 TABLET, FILM COATED ORAL
Qty: 4 TABLET | Refills: 0 | Status: SHIPPED | OUTPATIENT
Start: 2022-05-05 | End: 2022-09-02

## 2022-05-05 RX ADMIN — ONDANSETRON 4 MG: 4 TABLET, ORALLY DISINTEGRATING ORAL at 18:23

## 2022-05-05 NOTE — ED PROVIDER NOTES
EMERGENCY DEPARTMENT HISTORY AND PHYSICAL EXAM      Date: 5/5/2022  Patient Name: Alpa Vega    History of Presenting Illness     Chief Complaint   Patient presents with    Nausea    Abdominal Pain     generalized       History Provided By: Patient    HPI: Alpa Vega, 24 y.o. male with a past medical history significant GERD, anxiety/depression presents to the ED with cc of abd pain, diarrhea since yesterday. Thinks he got a bad sandwich from [facility]. Nausea but no vomiting. Able to keep po fluids down. No melena, hematochezia, Fever, cough, back pain, urinary sx. PCP: Nichole Browne,     No current facility-administered medications on file prior to encounter. Current Outpatient Medications on File Prior to Encounter   Medication Sig Dispense Refill    dicyclomine (BENTYL) 10 mg/5 mL soln oral solution Take 10 mL by mouth four (4) times daily. 450 mL 0       Past History     Past Medical History:  Past Medical History:   Diagnosis Date    Anxiety     GERD (gastroesophageal reflux disease)     Psychiatric disorder     Anxiety, Depression       Past Surgical History:  No past surgical history on file. Family History:  History reviewed. No pertinent family history. Social History:  Social History     Tobacco Use    Smoking status: Never Smoker    Smokeless tobacco: Never Used   Substance Use Topics    Alcohol use: Never    Drug use: Never       Allergies:  No Known Allergies      Review of Systems   Review of Systems   Constitutional: Negative for fever. Gastrointestinal: Negative for blood in stool and vomiting. All other systems reviewed and are negative. Physical Exam   Physical Exam  Vitals and nursing note reviewed. Constitutional:       Appearance: Normal appearance. He is not ill-appearing. HENT:      Head: Normocephalic and atraumatic.       Nose: Nose normal.      Mouth/Throat:      Mouth: Mucous membranes are moist.      Pharynx: No oropharyngeal exudate or posterior oropharyngeal erythema. Eyes:      General: No scleral icterus. Extraocular Movements: Extraocular movements intact. Pupils: Pupils are equal, round, and reactive to light. Cardiovascular:      Rate and Rhythm: Normal rate and regular rhythm. Pulses: Normal pulses. Heart sounds: Normal heart sounds. Pulmonary:      Effort: Pulmonary effort is normal.      Breath sounds: Normal breath sounds. No wheezing, rhonchi or rales. Abdominal:      General: Bowel sounds are normal.      Palpations: Abdomen is soft. Tenderness: There is no abdominal tenderness. There is no guarding or rebound. Musculoskeletal:         General: Normal range of motion. Cervical back: Normal range of motion and neck supple. Right lower leg: No edema. Left lower leg: No edema. Skin:     General: Skin is warm and dry. Findings: No rash. Neurological:      General: No focal deficit present. Mental Status: He is alert and oriented to person, place, and time. Comments: Nl gross motor/sensory exam without any focal or lateralizing deficits   Psychiatric:         Mood and Affect: Mood normal.         Behavior: Behavior normal.         Diagnostic Study Results     Labs -   No results found for this or any previous visit (from the past 12 hour(s)). Radiologic Studies -   No orders to display     CT Results  (Last 48 hours)    None        CXR Results  (Last 48 hours)    None            Medical Decision Making   I am the first provider for this patient. I reviewed the vital signs, available nursing notes, past medical history, past surgical history, family history and social history. Vital Signs-Reviewed the patient's vital signs.   Patient Vitals for the past 12 hrs:   Temp Pulse Resp BP SpO2   05/05/22 1645 98.5 °F (36.9 °C) 63 18 120/71 99 %       Records Reviewed: Nursing Notes    Provider Notes (Medical Decision Making):   Diff Dx: foodborne vs viral illness      ED Course:   Initial assessment performed. The patients presenting problems have been discussed, and they are in agreement with the care plan formulated and outlined with them. I have encouraged them to ask questions as they arise throughout their visit. Pt with benign exam, discussed sx mgmnt, po Zofran here, discussed s/sx for which to return. PLAN:  1. Current Discharge Medication List      START taking these medications    Details   ondansetron hcl (Zofran) 4 mg tablet Take 1 Tablet by mouth every eight (8) hours as needed for Nausea. Qty: 4 Tablet, Refills: 0  Start date: 5/5/2022           2. Follow-up Information     Follow up With Specialties Details Why Contact Info    Paige Hagan, DO Family Medicine  As needed [address] [facility] [address]  [phone]          Return to ED if worse     Diagnosis     Clinical Impression:   1.  Gastroenteritis

## 2022-05-05 NOTE — Clinical Note
[facility] EMERGENCY DEPARTMENT  [address] [facility] [id number] [phone]    Work/School Note    Date: 5/5/2022    To Whom It May concern:    Alita Goodpasture was seen and treated today in the emergency room by the following provider(s):  Attending Provider: Lia Laughlin MD.      Alita Goodpasture is excused from work/school on 05/05/22 and 05/06/22. He is medically clear to return to work/school on 5/7/2022.        Sincerely,          Maye East MD

## 2022-05-05 NOTE — Clinical Note
[address] [facility] EMERGENCY DEPARTMENT  [address] [facility] [address]  [phone]    Work/School Note    Date: 5/5/2022    To Whom It May concern:    Franco Page was seen and treated today in the emergency room by the following provider(s):  Attending Provider: Molly Lou MD.      Franco Page is excused from work/school on 05/05/22 and 05/06/22. He is medically clear to return to work/school on 5/7/2022.        Sincerely,          Awa Harvey MD

## 2022-05-05 NOTE — ED TRIAGE NOTES
24 yr old thinks he ate a sandwich that gave him food poisoning. States nausea no vomiting, some diarrhea and generalized stomach ache.

## 2022-06-24 ENCOUNTER — HOSPITAL ENCOUNTER (EMERGENCY)
Age: 22
Discharge: HOME | End: 2022-06-24
Attending: EMERGENCY MEDICINE | Admitting: EMERGENCY MEDICINE
Payer: MEDICAID

## 2022-06-24 VITALS
BODY MASS INDEX: 35 KG/M2 | TEMPERATURE: 98.8 F | HEIGHT: 71 IN | HEART RATE: 74 BPM | WEIGHT: 250 LBS | OXYGEN SATURATION: 98 % | RESPIRATION RATE: 20 BRPM | SYSTOLIC BLOOD PRESSURE: 144 MMHG | DIASTOLIC BLOOD PRESSURE: 91 MMHG

## 2022-06-24 DIAGNOSIS — E83.42 HYPOMAGNESEMIA: ICD-10-CM

## 2022-06-24 DIAGNOSIS — E87.6 HYPOKALEMIA: Primary | ICD-10-CM

## 2022-06-24 DIAGNOSIS — R42 DIZZINESS: ICD-10-CM

## 2022-06-24 LAB
ALBUMIN SERPL-MCNC: 3.7 G/DL (ref 3.5–5)
ALBUMIN/GLOB SERPL: 0.8 {RATIO} (ref 1.1–2.2)
ALP SERPL-CCNC: 52 U/L (ref 45–117)
ALT SERPL-CCNC: 11 U/L (ref 12–78)
ALT SERPL-CCNC: ABNORMAL U/L (ref 12–78)
ANION GAP SERPL CALC-SCNC: 3 MMOL/L (ref 5–15)
AST SERPL W P-5'-P-CCNC: 14 U/L (ref 15–37)
AST SERPL W P-5'-P-CCNC: ABNORMAL U/L (ref 15–37)
BASOPHILS # BLD: 0 K/UL (ref 0–0.1)
BASOPHILS NFR BLD: 0 % (ref 0–1)
BILIRUB SERPL-MCNC: 0.3 MG/DL (ref 0.2–1)
BUN SERPL-MCNC: 10 MG/DL (ref 6–20)
BUN/CREAT SERPL: 11 (ref 12–20)
CA-I BLD-MCNC: 9.2 MG/DL (ref 8.5–10.1)
CHLORIDE SERPL-SCNC: 107 MMOL/L (ref 97–108)
CO2 SERPL-SCNC: 24 MMOL/L (ref 21–32)
COVID-19 RAPID TEST, COVR: NOT DETECTED
CREAT SERPL-MCNC: 0.9 MG/DL (ref 0.7–1.3)
DIFFERENTIAL METHOD BLD: NORMAL
EOSINOPHIL # BLD: 0.1 K/UL (ref 0–0.4)
EOSINOPHIL NFR BLD: 1 % (ref 0–7)
ERYTHROCYTE [DISTWIDTH] IN BLOOD BY AUTOMATED COUNT: 12.2 % (ref 11.5–14.5)
FLUAV AG NPH QL IA: NEGATIVE
FLUBV AG NOSE QL IA: NEGATIVE
GLOBULIN SER CALC-MCNC: 4.7 G/DL (ref 2–4)
GLUCOSE SERPL-MCNC: 96 MG/DL (ref 65–100)
HCT VFR BLD AUTO: 44.7 % (ref 36.6–50.3)
HGB BLD-MCNC: 14.9 G/DL (ref 12.1–17)
IMM GRANULOCYTES # BLD AUTO: 0 K/UL (ref 0–0.04)
IMM GRANULOCYTES NFR BLD AUTO: 0 % (ref 0–0.5)
LYMPHOCYTES # BLD: 2.4 K/UL (ref 0.8–3.5)
LYMPHOCYTES NFR BLD: 27 % (ref 12–49)
MAGNESIUM SERPL-MCNC: 0.9 MG/DL (ref 1.6–2.4)
MAGNESIUM SERPL-MCNC: 2.1 MG/DL (ref 1.6–2.4)
MAGNESIUM SERPL-MCNC: NORMAL MG/DL (ref 1.6–2.4)
MCH RBC QN AUTO: 31.8 PG (ref 26–34)
MCHC RBC AUTO-ENTMCNC: 33.3 G/DL (ref 30–36.5)
MCV RBC AUTO: 95.3 FL (ref 80–99)
MONOCYTES # BLD: 0.8 K/UL (ref 0–1)
MONOCYTES NFR BLD: 9 % (ref 5–13)
NEUTS SEG # BLD: 5.4 K/UL (ref 1.8–8)
NEUTS SEG NFR BLD: 63 % (ref 32–75)
NRBC # BLD: 0 K/UL (ref 0–0.01)
NRBC BLD-RTO: 0 PER 100 WBC
PLATELET # BLD AUTO: 185 K/UL (ref 150–400)
PMV BLD AUTO: 12.4 FL (ref 8.9–12.9)
POTASSIUM SERPL-SCNC: 2.2 MMOL/L (ref 3.5–5.1)
POTASSIUM SERPL-SCNC: 3.8 MMOL/L (ref 3.5–5.1)
POTASSIUM SERPL-SCNC: ABNORMAL MMOL/L (ref 3.5–5.1)
PROT SERPL-MCNC: 8.4 G/DL (ref 6.4–8.2)
RBC # BLD AUTO: 4.69 M/UL (ref 4.1–5.7)
SODIUM SERPL-SCNC: 134 MMOL/L (ref 136–145)
TROPONIN-HIGH SENSITIVITY: 6 NG/L (ref 0–76)
WBC # BLD AUTO: 8.8 K/UL (ref 4.1–11.1)

## 2022-06-24 PROCEDURE — 84132 ASSAY OF SERUM POTASSIUM: CPT

## 2022-06-24 PROCEDURE — 36415 COLL VENOUS BLD VENIPUNCTURE: CPT

## 2022-06-24 PROCEDURE — 84484 ASSAY OF TROPONIN QUANT: CPT

## 2022-06-24 PROCEDURE — 74011250636 HC RX REV CODE- 250/636: Performed by: EMERGENCY MEDICINE

## 2022-06-24 PROCEDURE — 85025 COMPLETE CBC W/AUTO DIFF WBC: CPT

## 2022-06-24 PROCEDURE — 99284 EMERGENCY DEPT VISIT MOD MDM: CPT

## 2022-06-24 PROCEDURE — 96365 THER/PROPH/DIAG IV INF INIT: CPT

## 2022-06-24 PROCEDURE — 87635 SARS-COV-2 COVID-19 AMP PRB: CPT

## 2022-06-24 PROCEDURE — 93005 ELECTROCARDIOGRAM TRACING: CPT

## 2022-06-24 PROCEDURE — 87804 INFLUENZA ASSAY W/OPTIC: CPT

## 2022-06-24 PROCEDURE — 84460 ALANINE AMINO (ALT) (SGPT): CPT

## 2022-06-24 PROCEDURE — 74011250637 HC RX REV CODE- 250/637: Performed by: EMERGENCY MEDICINE

## 2022-06-24 PROCEDURE — 83735 ASSAY OF MAGNESIUM: CPT

## 2022-06-24 PROCEDURE — 96366 THER/PROPH/DIAG IV INF ADDON: CPT

## 2022-06-24 PROCEDURE — 84450 TRANSFERASE (AST) (SGOT): CPT

## 2022-06-24 RX ORDER — MAGNESIUM SULFATE HEPTAHYDRATE 40 MG/ML
2 INJECTION, SOLUTION INTRAVENOUS
Status: COMPLETED | OUTPATIENT
Start: 2022-06-24 | End: 2022-06-24

## 2022-06-24 RX ORDER — POTASSIUM CHLORIDE 750 MG/1
40 TABLET, FILM COATED, EXTENDED RELEASE ORAL DAILY
Status: DISCONTINUED | OUTPATIENT
Start: 2022-06-24 | End: 2022-06-25 | Stop reason: HOSPADM

## 2022-06-24 RX ADMIN — SODIUM CHLORIDE 1000 ML: 9 INJECTION, SOLUTION INTRAVENOUS at 17:09

## 2022-06-24 RX ADMIN — POTASSIUM CHLORIDE 40 MEQ: 750 TABLET, FILM COATED, EXTENDED RELEASE ORAL at 19:25

## 2022-06-24 RX ADMIN — MAGNESIUM SULFATE HEPTAHYDRATE 2 G: 2 INJECTION, SOLUTION INTRAVENOUS at 19:25

## 2022-06-24 NOTE — Clinical Note
[address] [facility] EMERGENCY DEPT  [address] [place] [address]  [phone]    Work/School Note    Date: 6/24/2022    To Whom It May concern:    Ricci Archer was seen and treated today in the emergency room by the following provider(s):  Attending Provider: Janna Anderson MD.      Ricci Archer is excused from work/school on 06/24/22 and 06/25/22. He is medically clear to return to work/school on 6/26/2022.        Sincerely,          Nessa Schreiber MD

## 2022-06-24 NOTE — ED TRIAGE NOTES
Ambulatory pt c/o x1wk dizziness, fatigue, and HA. States symptoms started a bout an hour after spraying Raid insecticide in various places in the house.  Diaphoretic in triage, NAD

## 2022-06-24 NOTE — ED PROVIDER NOTES
EMERGENCY DEPARTMENT HISTORY AND PHYSICAL EXAM      Date: 6/24/2022  Patient Name: Cris Mcneill      History of Presenting Illness     Chief Complaint   Patient presents with    Dizziness    Fatigue       History Provided By: Patient    HPI: Cris Mcneill, 24 y.o. male with a past medical history significant anxiety, GERD presents to the ED with cc of some fatigue. Been going on for about a week. Seems to come and go. He is eating and drinking okay. Does have some nausea associated with this. No vomiting or fever. No head injuries. Recent illnesses. Does have a history of anxiety. There are no other complaints, changes, or physical findings at this time. PCP: Amira Demarco DO    Current Facility-Administered Medications   Medication Dose Route Frequency Provider Last Rate Last Admin    potassium chloride SR (KLOR-CON 10) tablet 40 mEq  40 mEq Oral DAILY Rose Ford MD   40 mEq at 06/24/22 1925     Current Outpatient Medications   Medication Sig Dispense Refill    ondansetron hcl (Zofran) 4 mg tablet Take 1 Tablet by mouth every eight (8) hours as needed for Nausea. 4 Tablet 0    dicyclomine (BENTYL) 10 mg/5 mL soln oral solution Take 10 mL by mouth four (4) times daily. 450 mL 0       Past History     Past Medical History:  Past Medical History:   Diagnosis Date    Anxiety     GERD (gastroesophageal reflux disease)     Psychiatric disorder     Anxiety, Depression       Past Surgical History:  No past surgical history on file. Family History:  No family history on file. Social History:  Social History     Tobacco Use    Smoking status: Never Smoker    Smokeless tobacco: Never Used   Substance Use Topics    Alcohol use: Never    Drug use: Never       Allergies:  No Known Allergies      Review of Systems   Review of Systems   Constitutional: Negative for chills and fever. HENT: Negative for congestion and nosebleeds. Eyes: Negative for blurred vision and discharge. Respiratory: Negative for cough. Cardiovascular: Negative for orthopnea. Gastrointestinal: Positive for nausea. Genitourinary: Negative for dysuria. Musculoskeletal: Negative for myalgias. Neurological: Positive for dizziness. Negative for weakness and headaches. Psychiatric/Behavioral: The patient is nervous/anxious. Physical Exam     Physical Exam  Vitals and nursing note reviewed. Constitutional:       General: He is not in acute distress. Appearance: Normal appearance. HENT:      Head: Normocephalic and atraumatic. Eyes:      Pupils: Pupils are equal, round, and reactive to light. Cardiovascular:      Rate and Rhythm: Normal rate and regular rhythm. Pulses: Normal pulses. Pulmonary:      Effort: Pulmonary effort is normal.   Musculoskeletal:         General: No swelling or deformity. Skin:     Coloration: Skin is not pale. Findings: No rash. Neurological:      General: No focal deficit present. Mental Status: He is alert and oriented to person, place, and time. Sensory: No sensory deficit. Motor: No weakness. Psychiatric:         Mood and Affect: Mood normal.         Behavior: Behavior normal.         Lab and Diagnostic Study Results     Labs -     Recent Results (from the past 12 hour(s))   CBC WITH AUTOMATED DIFF    Collection Time: 06/24/22  4:18 PM   Result Value Ref Range    WBC 8.8 4.1 - 11.1 K/uL    RBC 4.69 4.10 - 5.70 M/uL    HGB 14.9 12.1 - 17.0 g/dL    HCT 44.7 36.6 - 50.3 %    MCV 95.3 80.0 - 99.0 FL    MCH 31.8 26.0 - 34.0 PG    MCHC 33.3 30.0 - 36.5 g/dL    RDW 12.2 11.5 - 14.5 %    PLATELET 858 126 - 621 K/uL    MPV 12.4 8.9 - 12.9 FL    NRBC 0.0 0.0  WBC    ABSOLUTE NRBC 0.00 0.00 - 0.01 K/uL    NEUTROPHILS 63 32 - 75 %    LYMPHOCYTES 27 12 - 49 %    MONOCYTES 9 5 - 13 %    EOSINOPHILS 1 0 - 7 %    BASOPHILS 0 0 - 1 %    IMMATURE GRANULOCYTES 0 0 - 0.5 %    ABS. NEUTROPHILS 5.4 1.8 - 8.0 K/UL    ABS.  LYMPHOCYTES 2.4 0.8 - 3.5 K/UL    ABS. MONOCYTES 0.8 0.0 - 1.0 K/UL    ABS. EOSINOPHILS 0.1 0.0 - 0.4 K/UL    ABS. BASOPHILS 0.0 0.0 - 0.1 K/UL    ABS. IMM. GRANS. 0.0 0.00 - 0.04 K/UL    DF AUTOMATED     METABOLIC PANEL, COMPREHENSIVE    Collection Time: 06/24/22  4:18 PM   Result Value Ref Range    Sodium 134 (L) 136 - 145 mmol/L    Potassium Hemolyzed, recollect requested 3.5 - 5.1 mmol/L    Chloride 107 97 - 108 mmol/L    CO2 24 21 - 32 mmol/L    Anion gap 3 (L) 5 - 15 mmol/L    Glucose 96 65 - 100 mg/dL    BUN 10 6 - 20 mg/dL    Creatinine 0.90 0.70 - 1.30 mg/dL    BUN/Creatinine ratio 11 (L) 12 - 20      GFR est AA >60 >60 ml/min/1.73m2    GFR est non-AA >60 >60 ml/min/1.73m2    Calcium 9.2 8.5 - 10.1 mg/dL    Bilirubin, total 0.3 0.2 - 1.0 mg/dL    AST (SGOT) Hemolyzed, recollect requested 15 - 37 U/L    ALT (SGPT) Hemolyzed, recollect requested 12 - 78 U/L    Alk.  phosphatase 52 45 - 117 U/L    Protein, total 8.4 (H) 6.4 - 8.2 g/dL    Albumin 3.7 3.5 - 5.0 g/dL    Globulin 4.7 (H) 2.0 - 4.0 g/dL    A-G Ratio 0.8 (L) 1.1 - 2.2     TROPONIN-HIGH SENSITIVITY    Collection Time: 06/24/22  4:18 PM   Result Value Ref Range    Troponin-High Sensitivity 6 0 - 76 ng/L   MAGNESIUM    Collection Time: 06/24/22  4:18 PM   Result Value Ref Range    Magnesium Hemolyzed, recollect requested 1.6 - 2.4 mg/dL   COVID-19 RAPID TEST    Collection Time: 06/24/22  4:59 PM   Result Value Ref Range    COVID-19 rapid test Not Detected Not Detected     INFLUENZA A & B AG (RAPID TEST)    Collection Time: 06/24/22  4:59 PM   Result Value Ref Range    Influenza A Antigen Negative Negative      Influenza B Antigen Negative Negative     POTASSIUM    Collection Time: 06/24/22  6:27 PM   Result Value Ref Range    Potassium 2.2 (LL) 3.5 - 5.1 mmol/L   MAGNESIUM    Collection Time: 06/24/22  6:27 PM   Result Value Ref Range    Magnesium 0.9 (LL) 1.6 - 2.4 mg/dL   AST    Collection Time: 06/24/22  6:27 PM   Result Value Ref Range    AST (SGOT) 14 (L) 15 - 37 U/L ALT    Collection Time: 06/24/22  6:27 PM   Result Value Ref Range    ALT (SGPT) 11 (L) 12 - 78 U/L   POTASSIUM    Collection Time: 06/24/22  7:53 PM   Result Value Ref Range    Potassium 3.8 3.5 - 5.1 mmol/L   MAGNESIUM    Collection Time: 06/24/22  7:53 PM   Result Value Ref Range    Magnesium 2.1 1.6 - 2.4 mg/dL       Radiologic Studies -   [unfilled]  CT Results  (Last 48 hours)    None        CXR Results  (Last 48 hours)    None          Medical Decision Making and ED Course   - I am the first and primary provider for this patient AND AM THE PRIMARY PROVIDER OF RECORD. - I reviewed the vital signs, available nursing notes, past medical history, past surgical history, family history and social history. - Initial assessment performed. The patients presenting problems have been discussed, and the staff are in agreement with the care plan formulated and outlined with them. I have encouraged them to ask questions as they arise throughout their visit. Vital Signs-Reviewed the patient's vital signs. Patient Vitals for the past 12 hrs:   Temp Pulse Resp BP SpO2   06/24/22 2130 -- 75 20 131/82 100 %   06/24/22 2045 -- 64 18 131/71 100 %   06/24/22 1923 -- -- -- 112/74 100 %   06/24/22 1753 -- 67 18 139/78 99 %   06/24/22 1559 98.8 °F (37.1 °C) 83 16 131/74 96 %     Records Reviewed: Nursing Notes    ED Course:       ED Course as of 06/24/22 2245 Fri Jun 24, 2022   160 59-year-old male presents for evaluation of 1 week of generalized dizziness and fatigue. Has nausea but no vomiting. No known sick contacts. States he has a history of anxiety. Normal neurologic exam here. Dizziness has spontaneously improved. Labs including a CBC, CMP, troponin, magnesium, COVID and influenza test.  Giving IV fluids. [LW]   1701 EKG performed at 1558, read at 1601. Rate is 86. Normal CA, normal QRS, normal QTC.  LVH. Patient has inverted T waves in the inferior anterior lateral leads. [LW]   1912 K is 2.2. Magnesium is 0.9. We will repeat to confirm levels and replete. [LW]   2226 Showed normalization. Mag is 2.1. Potassium is 3.8. [LW]   2242 Spoke with Dr. Liliane Desai about patient's abnormal ECG. Recommended observation admission. Repeat potassium and magnesium have normalized. I discussed with risks with the patient and he feels comfortable going home. He will follow-up in clinic with Dr. Liliane Desai. Patient's agrees with plan. States he will return if he recurrent symptoms. Discharged home. [LW]      ED Course User Index  [LW] Benedicto Mendenhall MD             Disposition     Disposition: DC- Adult Discharges: All of the diagnostic tests were reviewed and questions answered. Diagnosis, care plan and treatment options were discussed. The patient understands the instructions and will follow up as directed. The patients results have been reviewed with them. They have been counseled regarding their diagnosis. The patient verbally convey understanding and agreement of the signs, symptoms, diagnosis, treatment and prognosis and additionally agrees to follow up as recommended with their PCP in 24 - 48 hours. They also agree with the care-plan and convey that all of their questions have been answered. I have also put together some discharge instructions for them that include: 1) educational information regarding their diagnosis, 2) how to care for their diagnosis at home, as well a 3) list of reasons why they would want to return to the ED prior to their follow-up appointment, should their condition change. Discharged    Remove if not discharged  DISCHARGE PLAN:  1. Current Discharge Medication List      CONTINUE these medications which have NOT CHANGED    Details   ondansetron hcl (Zofran) 4 mg tablet Take 1 Tablet by mouth every eight (8) hours as needed for Nausea. Qty: 4 Tablet, Refills: 0      dicyclomine (BENTYL) 10 mg/5 mL soln oral solution Take 10 mL by mouth four (4) times daily.   Qty: 450 mL, Refills: 0           2. Follow-up Information     Follow up With Specialties Details Why [address] [facility], [address] [facility], DO Family Medicine In 3 days  [address]  [phone]      [facility] EMERGENCY DEPT Emergency Medicine  As needed [address] [facility] Eliu Alexander MD Cardio Vascular Surgery, Cardiovascular Disease Physician Schedule an appointment as soon as possible for a visit   [facility]. [address]  [phone]          3. Return to ED if worse   4. Current Discharge Medication List          Diagnosis     Clinical Impression:   1. Hypokalemia    2. Hypomagnesemia    3. Dizziness        Attestations:    Felix Loya MD    Please note that this dictation was completed with [employer], the computer voice recognition software. Quite often unanticipated grammatical, syntax, homophones, and other interpretive errors are inadvertently transcribed by the computer software. Please disregard these errors. Please excuse any errors that have escaped final proofreading. Thank you.

## 2022-06-25 LAB
ATRIAL RATE: 86 BPM
CALCULATED P AXIS, ECG09: 64 DEGREES
CALCULATED R AXIS, ECG10: 59 DEGREES
CALCULATED T AXIS, ECG11: -138 DEGREES
DIAGNOSIS, 93000: NORMAL
P-R INTERVAL, ECG05: 164 MS
Q-T INTERVAL, ECG07: 368 MS
QRS DURATION, ECG06: 88 MS
QTC CALCULATION (BEZET), ECG08: 440 MS
VENTRICULAR RATE, ECG03: 86 BPM

## 2022-06-25 NOTE — DISCHARGE INSTRUCTIONS
Thank you! Thank you for allowing me to care for you in the emergency department. I sincerely hope that you are satisfied with your visit today. It is my goal to provide you with excellent care. Below you will find a list of your labs and imaging from your visit today. Should you have any questions regarding these results please do not hesitate to call the emergency department. Labs -     Recent Results (from the past 12 hour(s))   CBC WITH AUTOMATED DIFF    Collection Time: 06/24/22  4:18 PM   Result Value Ref Range    WBC 8.8 4.1 - 11.1 K/uL    RBC 4.69 4.10 - 5.70 M/uL    HGB 14.9 12.1 - 17.0 g/dL    HCT 44.7 36.6 - 50.3 %    MCV 95.3 80.0 - 99.0 FL    MCH 31.8 26.0 - 34.0 PG    MCHC 33.3 30.0 - 36.5 g/dL    RDW 12.2 11.5 - 14.5 %    PLATELET 376 787 - 674 K/uL    MPV 12.4 8.9 - 12.9 FL    NRBC 0.0 0.0  WBC    ABSOLUTE NRBC 0.00 0.00 - 0.01 K/uL    NEUTROPHILS 63 32 - 75 %    LYMPHOCYTES 27 12 - 49 %    MONOCYTES 9 5 - 13 %    EOSINOPHILS 1 0 - 7 %    BASOPHILS 0 0 - 1 %    IMMATURE GRANULOCYTES 0 0 - 0.5 %    ABS. NEUTROPHILS 5.4 1.8 - 8.0 K/UL    ABS. LYMPHOCYTES 2.4 0.8 - 3.5 K/UL    ABS. MONOCYTES 0.8 0.0 - 1.0 K/UL    ABS. EOSINOPHILS 0.1 0.0 - 0.4 K/UL    ABS. BASOPHILS 0.0 0.0 - 0.1 K/UL    ABS. IMM.  GRANS. 0.0 0.00 - 0.04 K/UL    DF AUTOMATED     METABOLIC PANEL, COMPREHENSIVE    Collection Time: 06/24/22  4:18 PM   Result Value Ref Range    Sodium 134 (L) 136 - 145 mmol/L    Potassium Hemolyzed, recollect requested 3.5 - 5.1 mmol/L    Chloride 107 97 - 108 mmol/L    CO2 24 21 - 32 mmol/L    Anion gap 3 (L) 5 - 15 mmol/L    Glucose 96 65 - 100 mg/dL    BUN 10 6 - 20 mg/dL    Creatinine 0.90 0.70 - 1.30 mg/dL    BUN/Creatinine ratio 11 (L) 12 - 20      GFR est AA >60 >60 ml/min/1.73m2    GFR est non-AA >60 >60 ml/min/1.73m2    Calcium 9.2 8.5 - 10.1 mg/dL    Bilirubin, total 0.3 0.2 - 1.0 mg/dL    AST (SGOT) Hemolyzed, recollect requested 15 - 37 U/L    ALT (SGPT) Hemolyzed, recollect requested 12 - 78 U/L    Alk. phosphatase 52 45 - 117 U/L    Protein, total 8.4 (H) 6.4 - 8.2 g/dL    Albumin 3.7 3.5 - 5.0 g/dL    Globulin 4.7 (H) 2.0 - 4.0 g/dL    A-G Ratio 0.8 (L) 1.1 - 2.2     TROPONIN-HIGH SENSITIVITY    Collection Time: 06/24/22  4:18 PM   Result Value Ref Range    Troponin-High Sensitivity 6 0 - 76 ng/L   MAGNESIUM    Collection Time: 06/24/22  4:18 PM   Result Value Ref Range    Magnesium Hemolyzed, recollect requested 1.6 - 2.4 mg/dL   COVID-19 RAPID TEST    Collection Time: 06/24/22  4:59 PM   Result Value Ref Range    COVID-19 rapid test Not Detected Not Detected     INFLUENZA A & B AG (RAPID TEST)    Collection Time: 06/24/22  4:59 PM   Result Value Ref Range    Influenza A Antigen Negative Negative      Influenza B Antigen Negative Negative     POTASSIUM    Collection Time: 06/24/22  6:27 PM   Result Value Ref Range    Potassium 2.2 (LL) 3.5 - 5.1 mmol/L   MAGNESIUM    Collection Time: 06/24/22  6:27 PM   Result Value Ref Range    Magnesium 0.9 (LL) 1.6 - 2.4 mg/dL   AST    Collection Time: 06/24/22  6:27 PM   Result Value Ref Range    AST (SGOT) 14 (L) 15 - 37 U/L   ALT    Collection Time: 06/24/22  6:27 PM   Result Value Ref Range    ALT (SGPT) 11 (L) 12 - 78 U/L   POTASSIUM    Collection Time: 06/24/22  7:53 PM   Result Value Ref Range    Potassium 3.8 3.5 - 5.1 mmol/L   MAGNESIUM    Collection Time: 06/24/22  7:53 PM   Result Value Ref Range    Magnesium 2.1 1.6 - 2.4 mg/dL       Radiologic Studies -   No orders to display     CT Results  (Last 48 hours)      None          CXR Results  (Last 48 hours)      None               If you feel that you have not received excellent quality care or timely care, please ask to speak to the nurse manager. Please choose us in the future for your continued health care needs.    ------------------------------------------------------------------------------------------------------------  The exam and treatment you received in the Emergency Department were for an urgent problem and are not intended as complete care. It is important that you follow-up with a doctor, nurse practitioner, or physician assistant to:  (1) confirm your diagnosis,  (2) re-evaluation of changes in your illness and treatment, and  (3) for ongoing care. If your symptoms become worse or you do not improve as expected and you are unable to reach your usual health care provider, you should return to the Emergency Department. We are available 24 hours a day. Please take your discharge instructions with you when you go to your follow-up appointment. If you have any problem arranging a follow-up appointment, contact the Emergency Department immediately. If a prescription has been provided, please have it filled as soon as possible to prevent a delay in treatment. Read the entire medication instruction sheet provided to you by the pharmacy. If you have any questions or reservations about taking the medication due to side effects or interactions with other medications, please call your primary care physician or contact the ER to speak with the charge nurse. Make an appointment with your family doctor or the physician you were referred to for follow-up of this visit as instructed on your discharge paperwork, as this is a mandatory follow-up. Return to the ER if you are unable to be seen or if you are unable to be seen in a timely manner. If you have any problem arranging the follow-up visit, contact the Emergency Department immediately.

## 2022-07-23 ENCOUNTER — HOSPITAL ENCOUNTER (EMERGENCY)
Age: 22
Discharge: HOME | End: 2022-07-23
Attending: FAMILY MEDICINE
Payer: MEDICAID

## 2022-07-23 VITALS
HEART RATE: 87 BPM | HEIGHT: 71 IN | RESPIRATION RATE: 17 BRPM | TEMPERATURE: 98.9 F | DIASTOLIC BLOOD PRESSURE: 72 MMHG | BODY MASS INDEX: 37.8 KG/M2 | WEIGHT: 270 LBS | OXYGEN SATURATION: 96 % | SYSTOLIC BLOOD PRESSURE: 132 MMHG

## 2022-07-23 DIAGNOSIS — M79.10 MYALGIA: ICD-10-CM

## 2022-07-23 DIAGNOSIS — R19.7 DIARRHEA, UNSPECIFIED TYPE: Primary | ICD-10-CM

## 2022-07-23 LAB
ANION GAP SERPL CALC-SCNC: 6 MMOL/L (ref 5–15)
BASOPHILS # BLD: 0 K/UL (ref 0–0.1)
BASOPHILS NFR BLD: 0 % (ref 0–1)
BUN SERPL-MCNC: 9 MG/DL (ref 6–20)
BUN/CREAT SERPL: 8 (ref 12–20)
CA-I BLD-MCNC: 9.5 MG/DL (ref 8.5–10.1)
CHLORIDE SERPL-SCNC: 104 MMOL/L (ref 97–108)
CO2 SERPL-SCNC: 29 MMOL/L (ref 21–32)
CREAT SERPL-MCNC: 1.06 MG/DL (ref 0.7–1.3)
DIFFERENTIAL METHOD BLD: NORMAL
EOSINOPHIL # BLD: 0 K/UL (ref 0–0.4)
EOSINOPHIL NFR BLD: 0 % (ref 0–7)
ERYTHROCYTE [DISTWIDTH] IN BLOOD BY AUTOMATED COUNT: 12.1 % (ref 11.5–14.5)
GLUCOSE SERPL-MCNC: 110 MG/DL (ref 65–100)
HCT VFR BLD AUTO: 42.5 % (ref 36.6–50.3)
HGB BLD-MCNC: 14 G/DL (ref 12.1–17)
IMM GRANULOCYTES # BLD AUTO: 0 K/UL (ref 0–0.04)
IMM GRANULOCYTES NFR BLD AUTO: 0 % (ref 0–0.5)
LYMPHOCYTES # BLD: 1.9 K/UL (ref 0.8–3.5)
LYMPHOCYTES NFR BLD: 21 % (ref 12–49)
MAGNESIUM SERPL-MCNC: 1.9 MG/DL (ref 1.6–2.4)
MCH RBC QN AUTO: 31.3 PG (ref 26–34)
MCHC RBC AUTO-ENTMCNC: 32.9 G/DL (ref 30–36.5)
MCV RBC AUTO: 94.9 FL (ref 80–99)
MONOCYTES # BLD: 0.7 K/UL (ref 0–1)
MONOCYTES NFR BLD: 8 % (ref 5–13)
NEUTS SEG # BLD: 6.4 K/UL (ref 1.8–8)
NEUTS SEG NFR BLD: 71 % (ref 32–75)
PLATELET # BLD AUTO: 204 K/UL (ref 150–400)
PMV BLD AUTO: 12.1 FL (ref 8.9–12.9)
POTASSIUM SERPL-SCNC: 3.8 MMOL/L (ref 3.5–5.1)
RBC # BLD AUTO: 4.48 M/UL (ref 4.1–5.7)
SODIUM SERPL-SCNC: 139 MMOL/L (ref 136–145)
WBC # BLD AUTO: 9.1 K/UL (ref 4.1–11.1)

## 2022-07-23 PROCEDURE — 83735 ASSAY OF MAGNESIUM: CPT

## 2022-07-23 PROCEDURE — 85025 COMPLETE CBC W/AUTO DIFF WBC: CPT

## 2022-07-23 PROCEDURE — 99283 EMERGENCY DEPT VISIT LOW MDM: CPT

## 2022-07-23 PROCEDURE — 36415 COLL VENOUS BLD VENIPUNCTURE: CPT

## 2022-07-23 PROCEDURE — 80048 BASIC METABOLIC PNL TOTAL CA: CPT

## 2022-07-23 NOTE — Clinical Note
[address] EMERGENCY DEPARTMENT  [address] [facility] [address]  [phone]    Work/School Note    Date: 7/23/2022    To Whom It May concern:    Saba Nelson was seen and treated today in the emergency room by the following provider(s):  Attending Provider: René Hilario DO. Saba Nelson is excused from work/school on 07/23/22 and 07/24/22. He is medically clear to return to work/school on 7/25/2022.        Sincerely,          Shirlene Slater DO

## 2022-07-24 NOTE — DISCHARGE INSTRUCTIONS
Thank you! Thank you for allowing me to care for you in the emergency department. It is my goal to provide you with excellent care. If you have not received excellent quality care, please ask to speak to the nurse manager. Please fill out the survey that will come to you by mail or email since we listen to your feedback! Below you will find a list of your tests from today's visit. Should you have any questions, please do not hesitate to call the emergency department. Labs  Recent Results (from the past 12 hour(s))   CBC WITH AUTOMATED DIFF    Collection Time: 07/23/22  9:45 PM   Result Value Ref Range    WBC 9.1 4.1 - 11.1 K/uL    RBC 4.48 4.10 - 5.70 M/uL    HGB 14.0 12.1 - 17.0 g/dL    HCT 42.5 36.6 - 50.3 %    MCV 94.9 80.0 - 99.0 FL    MCH 31.3 26.0 - 34.0 PG    MCHC 32.9 30.0 - 36.5 g/dL    RDW 12.1 11.5 - 14.5 %    PLATELET 085 826 - 874 K/uL    MPV 12.1 8.9 - 12.9 FL    NEUTROPHILS 71 32 - 75 %    LYMPHOCYTES 21 12 - 49 %    MONOCYTES 8 5 - 13 %    EOSINOPHILS 0 0 - 7 %    BASOPHILS 0 0 - 1 %    IMMATURE GRANULOCYTES 0 0.0 - 0.5 %    ABS. NEUTROPHILS 6.4 1.8 - 8.0 K/UL    ABS. LYMPHOCYTES 1.9 0.8 - 3.5 K/UL    ABS. MONOCYTES 0.7 0.0 - 1.0 K/UL    ABS. EOSINOPHILS 0.0 0.0 - 0.4 K/UL    ABS. BASOPHILS 0.0 0.0 - 0.1 K/UL    ABS. IMM.  GRANS. 0.0 0.00 - 0.04 K/UL    DF AUTOMATED     METABOLIC PANEL, BASIC    Collection Time: 07/23/22  9:45 PM   Result Value Ref Range    Sodium 139 136 - 145 mmol/L    Potassium 3.8 3.5 - 5.1 mmol/L    Chloride 104 97 - 108 mmol/L    CO2 29 21 - 32 mmol/L    Anion gap 6 5 - 15 mmol/L    Glucose 110 (H) 65 - 100 mg/dL    BUN 9 6 - 20 mg/dL    Creatinine 1.06 0.70 - 1.30 mg/dL    BUN/Creatinine ratio 8 (L) 12 - 20      GFR est AA >60 >60 ml/min/1.73m2    GFR est non-AA >60 >60 ml/min/1.73m2    Calcium 9.5 8.5 - 10.1 mg/dL   MAGNESIUM    Collection Time: 07/23/22  9:45 PM   Result Value Ref Range    Magnesium 1.9 1.6 - 2.4 mg/dL       Radiologic Studies  No orders to display CT Results  (Last 48 hours)      None          CXR Results  (Last 48 hours)      None          ------------------------------------------------------------------------------------------------------------  The exam and treatment you received in the Emergency Department were for an urgent problem and are not intended as complete care. It is important that you follow-up with a doctor, nurse practitioner, or physician assistant to:  (1) confirm your diagnosis,  (2) re-evaluation of changes in your illness and treatment, and  (3) for ongoing care. Please take your discharge instructions with you when you go to your follow-up appointment. If you have any problem arranging a follow-up appointment, contact the Emergency Department. If your symptoms become worse or you do not improve as expected and you are unable to reach your health care provider, please return to the Emergency Department. We are available 24 hours a day. If a prescription has been provided, please have it filled as soon as possible to prevent a delay in treatment. If you have any questions or reservations about taking the medication due to side effects or interactions with other medications, please call your primary care provider or contact the ER.

## 2022-07-24 NOTE — ED TRIAGE NOTES
Pt reports headaches, weakness and diarrhea for 2 days. Pt recently seen at [facility] for the same thing where he was told his magnesium was low.

## 2022-07-25 NOTE — ED PROVIDER NOTES
EMERGENCY DEPARTMENT HISTORY AND PHYSICAL EXAM      Date: 7/23/2022  Patient Name: Saba Nelson    History of Presenting Illness         History Provided By:     HPI: Saba Nelson, is a very pleasant 24 y.o. male presenting to the ED with a chief complaint of fatigue and diarrhea. Diarrhea is nonbloody not dark. No nausea or vomiting. No abdominal pain. Patient states this happened previously and his potassium was low. Also having body aches. No fevers chills rigors. The patient denies any other symptoms at this time. PCP: Jorge A Ogden, DO    No current facility-administered medications on file prior to encounter. Current Outpatient Medications on File Prior to Encounter   Medication Sig Dispense Refill    ondansetron hcl (Zofran) 4 mg tablet Take 1 Tablet by mouth every eight (8) hours as needed for Nausea. 4 Tablet 0    dicyclomine (BENTYL) 10 mg/5 mL soln oral solution Take 10 mL by mouth four (4) times daily. 450 mL 0       Past History     Past Medical History:  Past Medical History:   Diagnosis Date    Anxiety     GERD (gastroesophageal reflux disease)     Psychiatric disorder     Anxiety, Depression       Past Surgical History:  History reviewed. No pertinent surgical history. Family History:  History reviewed. No pertinent family history. Social History:  Social History     Tobacco Use    Smoking status: Never    Smokeless tobacco: Never   Substance Use Topics    Alcohol use: Never    Drug use: Never       Allergies:  No Known Allergies      Review of Systems     Review of Systems   Constitutional:  Negative for activity change, appetite change, chills, fatigue and fever. HENT:  Negative for congestion and sore throat. Eyes:  Negative for photophobia and visual disturbance. Respiratory:  Negative for cough, shortness of breath and wheezing. Cardiovascular:  Negative for chest pain, palpitations and leg swelling.    Gastrointestinal:  Negative for abdominal pain, diarrhea, nausea and vomiting. Endocrine: Negative for cold intolerance and heat intolerance. Musculoskeletal:  Negative for gait problem and joint swelling. Skin:  Negative for color change and rash. Neurological:  Negative for dizziness and headaches. Physical Exam     Physical Exam  Constitutional:       General: He is not in acute distress. Appearance: Normal appearance. He is not toxic-appearing. HENT:      Head: Normocephalic and atraumatic. Right Ear: External ear normal.      Left Ear: External ear normal.      Mouth/Throat:      Mouth: Mucous membranes are moist.      Pharynx: Oropharynx is clear. Eyes:      Extraocular Movements: Extraocular movements intact. Conjunctiva/sclera: Conjunctivae normal.   Cardiovascular:      Rate and Rhythm: Normal rate and regular rhythm. Pulses: Normal pulses. Heart sounds: Normal heart sounds. Pulmonary:      Effort: Pulmonary effort is normal. No respiratory distress. Breath sounds: Normal breath sounds. No wheezing, rhonchi or rales. Abdominal:      General: There is no distension. Palpations: Abdomen is soft. Tenderness: There is no abdominal tenderness. There is no guarding or rebound. Musculoskeletal:         General: No deformity. Cervical back: Normal range of motion and neck supple. Right lower leg: No edema. Left lower leg: No edema. Skin:     Capillary Refill: Capillary refill takes less than 2 seconds. Findings: No erythema or rash. Neurological:      General: No focal deficit present. Mental Status: He is alert and oriented to person, place, and time. Gait: Gait normal.   Psychiatric:         Mood and Affect: Mood normal.         Behavior: Behavior normal.       Lab and Diagnostic Study Results     Labs -   No results found for this or any previous visit (from the past 12 hour(s)).     Radiologic Studies -   @lastxrresult@  CT Results  (Last 48 hours)      None CXR Results  (Last 48 hours)      None              Medical Decision Making   - I am the first provider for this patient. - I reviewed the vital signs, available nursing notes, past medical history, past surgical history, family history and social history. - Initial assessment performed. The patients presenting problems have been discussed, and they are in agreement with the care plan formulated and outlined with them. I have encouraged them to ask questions as they arise throughout their visit. Vital Signs-Reviewed the patient's vital signs. No data found. ED Course/ Provider Notes (Medical Decision Making):     Patient presented to the emergency department with the aforementioned chief complaint. On examination the patient is nontoxic. Vitals were reviewed per above. CBC within normal limits. No electrolyte disturbances. Abdomen is soft and nontender. Do not feel imaging necessary at this time. Discussed supportive measures for likely self-limiting enteritis. Megan Whitfield was given a thorough list of signs and symptoms that would warrant an immediate return to the emergency department. Otherwise Megan Whitfield will follow up with PCP. Procedures   Medical Decision Makingedical Decision Making  Performed by: Perico Broussard DO  Procedures  None       Disposition   Disposition:     Home     All of the diagnostic tests were reviewed and questions answered. Diagnosis, care plan and treatment options were discussed. The patient understands the instructions and will follow up as directed. The patients results have been reviewed with them. They have been counseled regarding their diagnosis. The patient verbally convey understanding and agreement of the signs, symptoms, diagnosis, treatment and prognosis and additionally agrees to follow up as recommended with their PCP in 24 - 48 hours. They also agree with the care-plan and convey that all of their questions have been answered.   I have also put together some discharge instructions for them that include: 1) educational information regarding their diagnosis, 2) how to care for their diagnosis at home, as well a 3) list of reasons why they would want to return to the ED prior to their follow-up appointment, should their condition change. DISCHARGE PLAN:    1. Cannot display discharge medications since this patient is not currently admitted. 2.   Follow-up Information       Follow up With Specialties Details Why Contact Info    Your primary care doctor  Schedule an appointment as soon as possible for a visit in 2 days                3.  Return to ED if worse       4. Discharge Medication List as of 7/23/2022 11:10 PM            Diagnosis     Clinical Impression:    1. Diarrhea, unspecified type    2. Myalgia        Attestations:    Denilson Kraft, DO    Please note that this dictation was completed with [email or web address], the computer voice recognition software. Quite often unanticipated grammatical, syntax, homophones, and other interpretive errors are inadvertently transcribed by the computer software. Please disregard these errors. Please excuse any errors that have escaped final proofreading. Thank you.

## 2022-09-02 ENCOUNTER — APPOINTMENT (OUTPATIENT)
Dept: GENERAL RADIOLOGY | Age: 22
End: 2022-09-02
Attending: EMERGENCY MEDICINE
Payer: MEDICAID

## 2022-09-02 ENCOUNTER — HOSPITAL ENCOUNTER (EMERGENCY)
Age: 22
Discharge: HOME | End: 2022-09-02
Attending: EMERGENCY MEDICINE
Payer: MEDICAID

## 2022-09-02 VITALS
SYSTOLIC BLOOD PRESSURE: 129 MMHG | TEMPERATURE: 98.2 F | HEIGHT: 71 IN | WEIGHT: 270 LBS | DIASTOLIC BLOOD PRESSURE: 74 MMHG | BODY MASS INDEX: 37.8 KG/M2 | OXYGEN SATURATION: 98 % | RESPIRATION RATE: 18 BRPM | HEART RATE: 78 BPM

## 2022-09-02 DIAGNOSIS — R42 DIZZINESS: Primary | ICD-10-CM

## 2022-09-02 LAB
ALBUMIN SERPL-MCNC: 4.5 G/DL (ref 3.5–5)
ALBUMIN/GLOB SERPL: 1.3 {RATIO} (ref 1.1–2.2)
ALP SERPL-CCNC: 67 U/L (ref 45–117)
ALT SERPL-CCNC: 33 U/L (ref 12–78)
ANION GAP SERPL CALC-SCNC: 10 MMOL/L (ref 5–15)
AST SERPL W P-5'-P-CCNC: 29 U/L (ref 15–37)
BASOPHILS # BLD: 0 K/UL (ref 0–0.1)
BASOPHILS NFR BLD: 0 % (ref 0–1)
BILIRUB SERPL-MCNC: 0.4 MG/DL (ref 0.2–1)
BUN SERPL-MCNC: 12 MG/DL (ref 6–20)
BUN/CREAT SERPL: 13 (ref 12–20)
CA-I BLD-MCNC: 9.8 MG/DL (ref 8.5–10.1)
CHLORIDE SERPL-SCNC: 104 MMOL/L (ref 97–108)
CO2 SERPL-SCNC: 28 MMOL/L (ref 21–32)
CREAT SERPL-MCNC: 0.89 MG/DL (ref 0.7–1.3)
DIFFERENTIAL METHOD BLD: NORMAL
EOSINOPHIL # BLD: 0.1 K/UL (ref 0–0.4)
EOSINOPHIL NFR BLD: 1 % (ref 0–7)
ERYTHROCYTE [DISTWIDTH] IN BLOOD BY AUTOMATED COUNT: 12.1 % (ref 11.5–14.5)
GLOBULIN SER CALC-MCNC: 3.6 G/DL (ref 2–4)
GLUCOSE SERPL-MCNC: 110 MG/DL (ref 65–100)
HCT VFR BLD AUTO: 41.1 % (ref 36.6–50.3)
HGB BLD-MCNC: 14 G/DL (ref 12.1–17)
IMM GRANULOCYTES # BLD AUTO: 0 K/UL (ref 0–0.04)
IMM GRANULOCYTES NFR BLD AUTO: 0 % (ref 0–0.5)
LYMPHOCYTES # BLD: 2.7 K/UL (ref 0.8–3.5)
LYMPHOCYTES NFR BLD: 31 % (ref 12–49)
MAGNESIUM SERPL-MCNC: 2 MG/DL (ref 1.6–2.4)
MCH RBC QN AUTO: 31.8 PG (ref 26–34)
MCHC RBC AUTO-ENTMCNC: 34.1 G/DL (ref 30–36.5)
MCV RBC AUTO: 93.4 FL (ref 80–99)
MONOCYTES # BLD: 0.8 K/UL (ref 0–1)
MONOCYTES NFR BLD: 10 % (ref 5–13)
NEUTS SEG # BLD: 5 K/UL (ref 1.8–8)
NEUTS SEG NFR BLD: 58 % (ref 32–75)
PLATELET # BLD AUTO: 199 K/UL (ref 150–400)
PMV BLD AUTO: 11.8 FL (ref 8.9–12.9)
POTASSIUM SERPL-SCNC: 3.8 MMOL/L (ref 3.5–5.1)
PROT SERPL-MCNC: 8.1 G/DL (ref 6.4–8.2)
RBC # BLD AUTO: 4.4 M/UL (ref 4.1–5.7)
SODIUM SERPL-SCNC: 142 MMOL/L (ref 136–145)
TROPONIN-HIGH SENSITIVITY: 11 NG/L (ref 0–76)
WBC # BLD AUTO: 8.6 K/UL (ref 4.1–11.1)

## 2022-09-02 PROCEDURE — 71045 X-RAY EXAM CHEST 1 VIEW: CPT

## 2022-09-02 PROCEDURE — 99285 EMERGENCY DEPT VISIT HI MDM: CPT

## 2022-09-02 PROCEDURE — 93005 ELECTROCARDIOGRAM TRACING: CPT

## 2022-09-02 PROCEDURE — 80053 COMPREHEN METABOLIC PANEL: CPT

## 2022-09-02 PROCEDURE — 84484 ASSAY OF TROPONIN QUANT: CPT

## 2022-09-02 PROCEDURE — 83735 ASSAY OF MAGNESIUM: CPT

## 2022-09-02 PROCEDURE — 85025 COMPLETE CBC W/AUTO DIFF WBC: CPT

## 2022-09-02 NOTE — ED TRIAGE NOTES
Pt states that he has had dizziness and fatigue for about a week. Pt states that it doesn't correlate to any specific time of day or activity. Has had some nausea with the dizziness and just very tired feeling.

## 2022-09-02 NOTE — ED PROVIDER NOTES
EMERGENCY DEPARTMENT HISTORY AND PHYSICAL EXAM      Date: 9/2/2022  Patient Name: Shantel Prajapati    History of Presenting Illness     Chief Complaint   Patient presents with    Dizziness       History Provided By: Patient    HPI: Shantel Prajapati, 25 y.o. male with past medical history significant for anxiety presenting to the emergency department for evaluation of intermittent dizziness ongoing over the course of the last 1 week. Patient reports he has had these symptoms previously which were related to electrolyte abnormalities. Notes no provocative or palliative factors. States that he was to follow-up with cardiology for evaluation of an abnormal EKG last month, however \"something came up. \"  Reports no tinnitus, hearing change or difficulty with walking. States that he is lightheaded rather than vertiginous. There are no other complaints, changes, or physical findings at this time. PCP: Pedro Felipe, DO    No current facility-administered medications on file prior to encounter. Current Outpatient Medications on File Prior to Encounter   Medication Sig Dispense Refill    [DISCONTINUED] ondansetron hcl (Zofran) 4 mg tablet Take 1 Tablet by mouth every eight (8) hours as needed for Nausea. 4 Tablet 0    [DISCONTINUED] dicyclomine (BENTYL) 10 mg/5 mL soln oral solution Take 10 mL by mouth four (4) times daily. 450 mL 0       Past History     Past Medical History:  Past Medical History:   Diagnosis Date    Anxiety     GERD (gastroesophageal reflux disease)     Psychiatric disorder     Anxiety, Depression       Past Surgical History:  History reviewed. No pertinent surgical history. Family History:  History reviewed. No pertinent family history.     Social History:  Social History     Tobacco Use    Smoking status: Never    Smokeless tobacco: Never   Substance Use Topics    Alcohol use: Never    Drug use: Never       Allergies:  No Known Allergies    Review of Systems   Review of Systems Constitutional:  Negative for chills and fever. HENT:  Negative for congestion and rhinorrhea. Eyes:  Negative for photophobia and visual disturbance. Respiratory:  Negative for cough and shortness of breath. Cardiovascular:  Negative for chest pain and palpitations. Gastrointestinal:  Negative for abdominal pain, diarrhea, nausea and vomiting. Genitourinary:  Negative for difficulty urinating and dysuria. Musculoskeletal:  Negative for arthralgias and myalgias. Skin:  Negative for color change and rash. Neurological:  Positive for light-headedness. Negative for weakness and headaches. Physical Exam   Physical Exam  Constitutional:       General: He is not in acute distress. Appearance: Normal appearance. He is not ill-appearing. HENT:      Head: Normocephalic and atraumatic. Right Ear: External ear normal.      Left Ear: External ear normal.      Nose: Nose normal.      Mouth/Throat:      Mouth: Mucous membranes are moist.   Eyes:      Extraocular Movements: Extraocular movements intact. Conjunctiva/sclera: Conjunctivae normal.      Pupils: Pupils are equal, round, and reactive to light. Cardiovascular:      Rate and Rhythm: Normal rate and regular rhythm. Pulses: Normal pulses. Pulmonary:      Effort: Pulmonary effort is normal. No respiratory distress. Breath sounds: Normal breath sounds. Abdominal:      General: Abdomen is flat. There is no distension. Musculoskeletal:         General: Normal range of motion. Cervical back: Normal range of motion. Skin:     General: Skin is warm and dry. Neurological:      General: No focal deficit present. Mental Status: He is alert and oriented to person, place, and time. Psychiatric:         Mood and Affect: Mood normal.         Behavior: Behavior normal.         Thought Content:  Thought content normal.         Judgment: Judgment normal.       Lab and Diagnostic Study Results   Labs -     Recent Results (from the past 12 hour(s))   CBC WITH AUTOMATED DIFF    Collection Time: 09/02/22  2:22 PM   Result Value Ref Range    WBC 8.6 4.1 - 11.1 K/uL    RBC 4.40 4. 10 - 5.70 M/uL    HGB 14.0 12.1 - 17.0 g/dL    HCT 41.1 36.6 - 50.3 %    MCV 93.4 80.0 - 99.0 FL    MCH 31.8 26.0 - 34.0 PG    MCHC 34.1 30.0 - 36.5 g/dL    RDW 12.1 11.5 - 14.5 %    PLATELET 605 655 - 303 K/uL    MPV 11.8 8.9 - 12.9 FL    NEUTROPHILS 58 32 - 75 %    LYMPHOCYTES 31 12 - 49 %    MONOCYTES 10 5 - 13 %    EOSINOPHILS 1 0 - 7 %    BASOPHILS 0 0 - 1 %    IMMATURE GRANULOCYTES 0 0.0 - 0.5 %    ABS. NEUTROPHILS 5.0 1.8 - 8.0 K/UL    ABS. LYMPHOCYTES 2.7 0.8 - 3.5 K/UL    ABS. MONOCYTES 0.8 0.0 - 1.0 K/UL    ABS. EOSINOPHILS 0.1 0.0 - 0.4 K/UL    ABS. BASOPHILS 0.0 0.0 - 0.1 K/UL    ABS. IMM. GRANS. 0.0 0.00 - 0.04 K/UL    DF AUTOMATED     METABOLIC PANEL, COMPREHENSIVE    Collection Time: 09/02/22  2:22 PM   Result Value Ref Range    Sodium 142 136 - 145 mmol/L    Potassium 3.8 3.5 - 5.1 mmol/L    Chloride 104 97 - 108 mmol/L    CO2 28 21 - 32 mmol/L    Anion gap 10 5 - 15 mmol/L    Glucose 110 (H) 65 - 100 mg/dL    BUN 12 6 - 20 mg/dL    Creatinine 0.89 0.70 - 1.30 mg/dL    BUN/Creatinine ratio 13 12 - 20      GFR est AA >60 >60 ml/min/1.73m2    GFR est non-AA >60 >60 ml/min/1.73m2    Calcium 9.8 8.5 - 10.1 mg/dL    Bilirubin, total 0.4 0.2 - 1.0 mg/dL    AST (SGOT) 29 15 - 37 U/L    ALT (SGPT) 33 12 - 78 U/L    Alk.  phosphatase 67 45 - 117 U/L    Protein, total 8.1 6.4 - 8.2 g/dL    Albumin 4.5 3.5 - 5.0 g/dL    Globulin 3.6 2.0 - 4.0 g/dL    A-G Ratio 1.3 1.1 - 2.2     MAGNESIUM    Collection Time: 09/02/22  2:22 PM   Result Value Ref Range    Magnesium 2.0 1.6 - 2.4 mg/dL   TROPONIN-HIGH SENSITIVITY    Collection Time: 09/02/22  2:22 PM   Result Value Ref Range    Troponin-High Sensitivity 11 0 - 76 ng/L       Radiologic Studies -   @lastxrresult@  CT Results  (Last 48 hours)      None          CXR Results  (Last 48 hours) 09/02/22 1431  XR CHEST PORT Final result    Impression:  No acute process. Narrative:  EXAM:  XR CHEST PORT       INDICATION: Dizziness       COMPARISON: None. TECHNIQUE: Portable AP chest view at 1423 hours       FINDINGS: The cardiomediastinal contours are within normal limits. The lungs and   pleural spaces are clear. There is no pneumothorax. The bones and upper abdomen   are unremarkable. Medical Decision Making and ED Course   Differential Diagnosis & Medical Decision Making Provider Note:   27-year-old male presenting for evaluation of 1 week of intermittent dizziness. States he has had these symptoms previously which were related to electrolyte abnormality. On exam, patient resting comfortably in bed. Vital signs within normal limits. He has no nystagmus. Gait nonantalgic. There is no truncal ataxia. EKG demonstrating LVH with T wave inversions consistent with the above. CBC, CMP, chest x-ray, troponin without significant abnormality. Patient has an upcoming appoint with his cardiologist on 9/8/2022. Stable for discharge home at this time. - I am the first provider for this patient. I reviewed the vital signs, available nursing notes, past medical history, past surgical history, family history and social history. The patients presenting problems have been discussed, and they are in agreement with the care plan formulated and outlined with them. I have encouraged them to ask questions as they arise throughout their visit. Vital Signs-Reviewed the patient's vital signs. Patient Vitals for the past 12 hrs:   Temp Pulse Resp BP SpO2   09/02/22 1357 98.2 °F (36.8 °C) 78 18 129/74 98 %       ED Course:          Procedures   Performed by: Madhavi Velez DO  Procedures      Disposition   Disposition: Condition stable  DC- Adult Discharges: All of the diagnostic tests were reviewed and questions answered.  Diagnosis, care plan and treatment options were discussed. The patient understands the instructions and will follow up as directed. The patients results have been reviewed with them. They have been counseled regarding their diagnosis. The patient verbally convey understanding and agreement of the signs, symptoms, diagnosis, treatment and prognosis and additionally agrees to follow up as recommended with their PCP in 24 - 48 hours. They also agree with the care-plan and convey that all of their questions have been answered. I have also put together some discharge instructions for them that include: 1) educational information regarding their diagnosis, 2) how to care for their diagnosis at home, as well a 3) list of reasons why they would want to return to the ED prior to their follow-up appointment, should their condition change. DC-The patient was given verbal follow-up instructions    DISCHARGE PLAN:  1. Cannot display discharge medications since this patient is not currently admitted. 2.   Follow-up Information       Follow up With Specialties Details Why Contact Info    [address] [place] [address] Emergency Medicine  As needed, If symptoms worsen [address] [place] [id number] [phone]    Nasreen Burnham DO Family Medicine Schedule an appointment as soon as possible for a visit    Araceli Harrell [facility] [address]      Jaguar Lugo MD Cardio Vascular Surgery, Cardiovascular Disease Physician Schedule an appointment as soon as possible for a visit   [facility]. 15  [phone]            3. Return to ED if worse   4. There are no discharge medications for this patient. Remove if admitted/transferred    Diagnosis/Clinical Impression     Clinical Impression:   1. Dizziness        Attestations: Zoë Ramirez DO, am the primary clinician of record.     Please note that this dictation was completed with [email or web address], the computer voice recognition software. Quite often unanticipated grammatical, syntax, homophones, and other interpretive errors are inadvertently transcribed by the computer software. Please disregard these errors. Please excuse any errors that have escaped final proofreading. Thank you.

## 2022-09-02 NOTE — DISCHARGE INSTRUCTIONS
Thank you! Thank you for allowing me to care for you in the emergency department. It is my goal to provide you with excellent care. If you have not received excellent quality care, please ask to speak to the nurse manager. Please fill out the survey that will come to you by mail or email since we listen to your feedback! Below you will find a list of your tests from today's visit. Should you have any questions, please do not hesitate to call the emergency department. Labs  Recent Results (from the past 12 hour(s))   CBC WITH AUTOMATED DIFF    Collection Time: 09/02/22  2:22 PM   Result Value Ref Range    WBC 8.6 4.1 - 11.1 K/uL    RBC 4.40 4. 10 - 5.70 M/uL    HGB 14.0 12.1 - 17.0 g/dL    HCT 41.1 36.6 - 50.3 %    MCV 93.4 80.0 - 99.0 FL    MCH 31.8 26.0 - 34.0 PG    MCHC 34.1 30.0 - 36.5 g/dL    RDW 12.1 11.5 - 14.5 %    PLATELET 269 102 - 350 K/uL    MPV 11.8 8.9 - 12.9 FL    NEUTROPHILS 58 32 - 75 %    LYMPHOCYTES 31 12 - 49 %    MONOCYTES 10 5 - 13 %    EOSINOPHILS 1 0 - 7 %    BASOPHILS 0 0 - 1 %    IMMATURE GRANULOCYTES 0 0.0 - 0.5 %    ABS. NEUTROPHILS 5.0 1.8 - 8.0 K/UL    ABS. LYMPHOCYTES 2.7 0.8 - 3.5 K/UL    ABS. MONOCYTES 0.8 0.0 - 1.0 K/UL    ABS. EOSINOPHILS 0.1 0.0 - 0.4 K/UL    ABS. BASOPHILS 0.0 0.0 - 0.1 K/UL    ABS. IMM. GRANS. 0.0 0.00 - 0.04 K/UL    DF AUTOMATED     METABOLIC PANEL, COMPREHENSIVE    Collection Time: 09/02/22  2:22 PM   Result Value Ref Range    Sodium 142 136 - 145 mmol/L    Potassium 3.8 3.5 - 5.1 mmol/L    Chloride 104 97 - 108 mmol/L    CO2 28 21 - 32 mmol/L    Anion gap 10 5 - 15 mmol/L    Glucose 110 (H) 65 - 100 mg/dL    BUN 12 6 - 20 mg/dL    Creatinine 0.89 0.70 - 1.30 mg/dL    BUN/Creatinine ratio 13 12 - 20      GFR est AA >60 >60 ml/min/1.73m2    GFR est non-AA >60 >60 ml/min/1.73m2    Calcium 9.8 8.5 - 10.1 mg/dL    Bilirubin, total 0.4 0.2 - 1.0 mg/dL    AST (SGOT) 29 15 - 37 U/L    ALT (SGPT) 33 12 - 78 U/L    Alk.  phosphatase 67 45 - 117 U/L    Protein, total 8.1 6.4 - 8.2 g/dL    Albumin 4.5 3.5 - 5.0 g/dL    Globulin 3.6 2.0 - 4.0 g/dL    A-G Ratio 1.3 1.1 - 2.2     MAGNESIUM    Collection Time: 09/02/22  2:22 PM   Result Value Ref Range    Magnesium 2.0 1.6 - 2.4 mg/dL   TROPONIN-HIGH SENSITIVITY    Collection Time: 09/02/22  2:22 PM   Result Value Ref Range    Troponin-High Sensitivity 11 0 - 76 ng/L       Radiologic Studies  XR CHEST PORT   Final Result   No acute process. CT Results  (Last 48 hours)      None          CXR Results  (Last 48 hours)                 09/02/22 1431  XR CHEST PORT Final result    Impression:  No acute process. Narrative:  EXAM:  XR CHEST PORT       INDICATION: Dizziness       COMPARISON: None. TECHNIQUE: Portable AP chest view at 1423 hours       FINDINGS: The cardiomediastinal contours are within normal limits. The lungs and   pleural spaces are clear. There is no pneumothorax. The bones and upper abdomen   are unremarkable.                 ------------------------------------------------------------------------------------------------------------  The exam and treatment you received in the Emergency Department were for an urgent problem and are not intended as complete care. It is important that you follow-up with a doctor, nurse practitioner, or physician assistant to:  (1) confirm your diagnosis,  (2) re-evaluation of changes in your illness and treatment, and  (3) for ongoing care. Please take your discharge instructions with you when you go to your follow-up appointment. If you have any problem arranging a follow-up appointment, contact the Emergency Department. If your symptoms become worse or you do not improve as expected and you are unable to reach your health care provider, please return to the Emergency Department. We are available 24 hours a day. If a prescription has been provided, please have it filled as soon as possible to prevent a delay in treatment.  If you have any questions or reservations about taking the medication due to side effects or interactions with other medications, please call your primary care provider or contact the ER.

## 2022-09-03 LAB
ATRIAL RATE: 80 BPM
CALCULATED P AXIS, ECG09: 64 DEGREES
CALCULATED R AXIS, ECG10: 42 DEGREES
CALCULATED T AXIS, ECG11: -146 DEGREES
DIAGNOSIS, 93000: NORMAL
P-R INTERVAL, ECG05: 162 MS
Q-T INTERVAL, ECG07: 386 MS
QRS DURATION, ECG06: 94 MS
QTC CALCULATION (BEZET), ECG08: 445 MS
VENTRICULAR RATE, ECG03: 80 BPM

## 2023-03-02 ENCOUNTER — HOSPITAL ENCOUNTER (EMERGENCY)
Age: 23
Discharge: HOME | End: 2023-03-02
Attending: EMERGENCY MEDICINE
Payer: MEDICAID

## 2023-03-02 VITALS
HEART RATE: 102 BPM | DIASTOLIC BLOOD PRESSURE: 77 MMHG | BODY MASS INDEX: 36.4 KG/M2 | SYSTOLIC BLOOD PRESSURE: 120 MMHG | WEIGHT: 260 LBS | RESPIRATION RATE: 18 BRPM | HEIGHT: 71 IN | OXYGEN SATURATION: 100 % | TEMPERATURE: 98.7 F

## 2023-03-02 DIAGNOSIS — R11.0 NAUSEA WITHOUT VOMITING: Primary | ICD-10-CM

## 2023-03-02 DIAGNOSIS — R19.7 DIARRHEA, UNSPECIFIED TYPE: ICD-10-CM

## 2023-03-02 DIAGNOSIS — R10.9 ABDOMINAL CRAMPING: ICD-10-CM

## 2023-03-02 PROCEDURE — 99283 EMERGENCY DEPT VISIT LOW MDM: CPT

## 2023-03-02 PROCEDURE — 74011250636 HC RX REV CODE- 250/636: Performed by: EMERGENCY MEDICINE

## 2023-03-02 RX ORDER — ONDANSETRON 4 MG/1
4 TABLET, ORALLY DISINTEGRATING ORAL
Status: COMPLETED | OUTPATIENT
Start: 2023-03-02 | End: 2023-03-02

## 2023-03-02 RX ORDER — ONDANSETRON 4 MG/1
4 TABLET, FILM COATED ORAL
Qty: 12 TABLET | Refills: 0 | Status: SHIPPED | OUTPATIENT
Start: 2023-03-02 | End: 2023-03-06

## 2023-03-02 RX ADMIN — ONDANSETRON 4 MG: 4 TABLET, ORALLY DISINTEGRATING ORAL at 05:57

## 2023-03-02 NOTE — Clinical Note
[address] EMERGENCY DEPARTMENT  [address] [facility] [address]  [phone]    Work/School Note    Date: 3/2/2023    To Whom It May concern:    Nelson Bull was seen and treated today in the emergency room by the following provider(s):  Attending Provider: Blake Licona MD.      Nelson Bull is excused from work/school on 03/02/23 and 03/03/23. He is medically clear to return to work/school on 3/4/2023.        Sincerely,          Hernandez Brumfield MD

## 2023-03-02 NOTE — ED PROVIDER NOTES
EMERGENCY DEPARTMENT HISTORY AND PHYSICAL EXAM      Date: 3/2/2023  Patient Name: Apple Cartwright    History of Presenting Illness     Chief Complaint   Patient presents with    Abdominal Pain       History Provided By: Patient    HPI: Apple Cartwright, 25 y.o. male   presents to the ED with cc of nausea and diarrhea. Patient complains of mild nausea that began 2 hours ago without episode of vomiting. Patient also states that he had a 2 episode of watery diarrhea at the same time. Patient complains of intermittent episode of generalized mild abdominal cramping that is improved after the diarrhea. No abdominal pain at this time. No URI symptoms. No signs of GI bleed. No dysuria hematuria. Patient denies use of EtOH or illicit drugs. PCP: Joleen Morales, DO    No current facility-administered medications on file prior to encounter. No current outpatient medications on file prior to encounter. Past History     Past Medical History:  Past Medical History:   Diagnosis Date    Anxiety     GERD (gastroesophageal reflux disease)     Psychiatric disorder     Anxiety, Depression       Past Surgical History:  History reviewed. No pertinent surgical history. Family History:  History reviewed. No pertinent family history. Social History:  Social History     Tobacco Use    Smoking status: Never    Smokeless tobacco: Never   Substance Use Topics    Alcohol use: Never    Drug use: Never       Allergies:  No Known Allergies      Review of Systems   Review of Systems   Constitutional:  Negative for chills and fever. HENT:  Negative for sore throat. Eyes:  Negative for discharge. Respiratory:  Negative for cough. Cardiovascular:  Negative for chest pain. Gastrointestinal:  Negative for abdominal pain and blood in stool. Genitourinary:  Negative for difficulty urinating. Musculoskeletal:  Negative for arthralgias. Skin:  Negative for rash. Neurological:  Negative for headaches. Psychiatric/Behavioral:  Negative for confusion. All other systems reviewed and are negative. Physical Exam   Physical Exam  Vitals and nursing note reviewed. Constitutional:       General: He is not in acute distress. Appearance: Normal appearance. He is not ill-appearing, toxic-appearing or diaphoretic. HENT:      Head: Normocephalic and atraumatic. Nose: No congestion. Mouth/Throat:      Mouth: Mucous membranes are moist.   Eyes:      General: No scleral icterus. Conjunctiva/sclera: Conjunctivae normal.   Cardiovascular:      Rate and Rhythm: Normal rate and regular rhythm. Heart sounds: Normal heart sounds. Pulmonary:      Effort: Pulmonary effort is normal.      Breath sounds: Normal breath sounds. Abdominal:      General: Abdomen is flat. Bowel sounds are normal. There is no distension. Palpations: Abdomen is soft. Tenderness: There is no abdominal tenderness. There is no right CVA tenderness, left CVA tenderness, guarding or rebound. Musculoskeletal:      Cervical back: Neck supple. Right lower leg: No edema. Left lower leg: No edema. Skin:     General: Skin is warm and dry. Neurological:      General: No focal deficit present. Mental Status: He is alert. Psychiatric:         Behavior: Behavior normal.         Thought Content: Thought content normal.       Diagnostic Study Results     Labs -   No results found for this or any previous visit (from the past 12 hour(s)). Radiologic Studies -   No orders to display     CT Results  (Last 48 hours)      None          CXR Results  (Last 48 hours)      None              Medical Decision Making   I am the first provider for this patient. I reviewed the vital signs, available nursing notes, past medical history, past surgical history, family history and social history. Vital Signs-Reviewed the patient's vital signs.   Patient Vitals for the past 12 hrs:   Temp Pulse Resp BP SpO2   03/02/23 0544 98.7 °F (37.1 °C) (!) 102 18 120/77 100 %       Records Reviewed:     Provider Notes (Medical Decision Making):   Patient presented to our history of mild nausea without vomiting and 2 episode of watery diarrhea. Patient also complains of mild intermittent episode of generalized abdominal cramping that improved after diarrhea. Clinically patient nontoxic-appearing. Abdomen soft nontender without guarding or rebound. Clinically no concern for bowel obstruction, pneumoperitoneum, or appendicitis. Patient was symptomatically treated for nausea and diarrhea with Zofran and diet instruction and discharged in stable condition. Return cautions given. ED Course:   Initial assessment performed. The patients presenting problems have been discussed, and they are in agreement with the care plan formulated and outlined with them. I have encouraged them to ask questions as they arise throughout their visit. No vomiting. PROCEDURES      Disposition: Condition stable   DC- Adult Discharges: All of the diagnostic tests were reviewed and questions answered. Diagnosis, care plan and treatment options were discussed. understand instructions and will follow up as directed. The patients results have been reviewed with them. They have been counseled regarding their diagnosis. The patient verbally convey understanding and agreement of the signs, symptoms, diagnosis, treatment and prognosis and additionally agrees to follow up as recommended. They also agree with the care-plan and convey that all of their questions have been answered. I have also put together some discharge instructions for them that include: 1) educational information regarding their diagnosis, 2) how to care for their diagnosis at home, as well a 3) list of reasons why they would want to return to the ED prior to their follow-up appointment, should their condition change. PLAN:  1.    Discharge Medication List as of 3/2/2023  5:58 AM START taking these medications    Details   ondansetron hcl (Zofran) 4 mg tablet Take 1 Tablet by mouth every eight (8) hours as needed for Nausea or Vomiting for up to 4 days. , Normal, Disp-12 Tablet, R-0           2. Follow-up Information       Follow up With Specialties Details Why Contact Info    Follow up with your primary care physician  Schedule an appointment as soon as possible for a visit in 3 days As needed           Return to ED if worse     Diagnosis     Clinical Impression:   1. Nausea without vomiting    2. Diarrhea, unspecified type    3. Abdominal cramping        Please note that this dictation was completed with [employer], the computer voice recognition software. Quite often unanticipated grammatical, syntax, homophones, and other interpretive errors are inadvertently transcribed by the computer software. Please disregard these errors. Please excuse any errors that have escaped final proofreading. Thank you.

## 2023-08-22 ENCOUNTER — HOSPITAL ENCOUNTER (EMERGENCY)
Facility: HOSPITAL | Age: 23
Discharge: HOME | End: 2023-08-22
Payer: MEDICAID

## 2023-08-22 ENCOUNTER — APPOINTMENT (OUTPATIENT)
Facility: HOSPITAL | Age: 23
End: 2023-08-22
Payer: MEDICAID

## 2023-08-22 VITALS
BODY MASS INDEX: 37.93 KG/M2 | WEIGHT: 280 LBS | OXYGEN SATURATION: 97 % | SYSTOLIC BLOOD PRESSURE: 137 MMHG | TEMPERATURE: 98.6 F | HEART RATE: 88 BPM | HEIGHT: 72 IN | RESPIRATION RATE: 16 BRPM | DIASTOLIC BLOOD PRESSURE: 78 MMHG

## 2023-08-22 DIAGNOSIS — M12.811 ROTATOR CUFF ARTHROPATHY OF RIGHT SHOULDER: Primary | ICD-10-CM

## 2023-08-22 DIAGNOSIS — M75.41 SUBACROMIAL IMPINGEMENT OF RIGHT SHOULDER: ICD-10-CM

## 2023-08-22 PROCEDURE — 6360000002 HC RX W HCPCS: Performed by: PHYSICIAN ASSISTANT

## 2023-08-22 PROCEDURE — 96372 THER/PROPH/DIAG INJ SC/IM: CPT

## 2023-08-22 PROCEDURE — 73030 X-RAY EXAM OF SHOULDER: CPT

## 2023-08-22 PROCEDURE — 99284 EMERGENCY DEPT VISIT MOD MDM: CPT

## 2023-08-22 RX ORDER — KETOROLAC TROMETHAMINE 30 MG/ML
30 INJECTION, SOLUTION INTRAMUSCULAR; INTRAVENOUS ONCE
Status: COMPLETED | OUTPATIENT
Start: 2023-08-22 | End: 2023-08-22

## 2023-08-22 RX ORDER — LIDOCAINE 4 G/G
1 PATCH TOPICAL DAILY
Qty: 5 EACH | Refills: 0 | Status: SHIPPED | OUTPATIENT
Start: 2023-08-22

## 2023-08-22 RX ORDER — IBUPROFEN 800 MG/1
800 TABLET ORAL EVERY 8 HOURS PRN
Qty: 20 TABLET | Refills: 0 | Status: SHIPPED | OUTPATIENT
Start: 2023-08-22

## 2023-08-22 RX ORDER — METHOCARBAMOL 750 MG/1
750 TABLET, FILM COATED ORAL 4 TIMES DAILY
Qty: 20 TABLET | Refills: 0 | Status: SHIPPED | OUTPATIENT
Start: 2023-08-22 | End: 2023-08-27

## 2023-08-22 RX ADMIN — KETOROLAC TROMETHAMINE 30 MG: 30 INJECTION, SOLUTION INTRAMUSCULAR; INTRAVENOUS at 19:11

## 2023-08-22 ASSESSMENT — PAIN - FUNCTIONAL ASSESSMENT: PAIN_FUNCTIONAL_ASSESSMENT: 0-10

## 2023-08-22 ASSESSMENT — LIFESTYLE VARIABLES
HOW MANY STANDARD DRINKS CONTAINING ALCOHOL DO YOU HAVE ON A TYPICAL DAY: PATIENT DOES NOT DRINK
HOW OFTEN DO YOU HAVE A DRINK CONTAINING ALCOHOL: NEVER

## 2023-08-22 ASSESSMENT — PAIN SCALES - GENERAL: PAINLEVEL_OUTOF10: 8

## 2023-08-22 NOTE — ED PROVIDER NOTES
[facility] EMERGENCY DEPT  EMERGENCY DEPARTMENT HISTORY AND PHYSICAL EXAM      Date: 8/22/2023  Patient Name: Bushra Craig. MRN: [id number]  YOB: 2000  Date of evaluation: 8/22/2023  Provider: Cosmo Lucas PA-C   Note Started: 6:48 PM EDT 8/22/23    HISTORY OF PRESENT ILLNESS     Chief Complaint   Patient presents with    Shoulder Pain       History Provided By: Patient    HPI: Bushra Erickson is a 21 y.o. male with a past medical history significant for GERD, anxiety, depression who presents this ED with CC of shoulder pain. Patient reports 3 to 4-day history of right shoulder pain. Notes that he has been doing more heavy lifting at work than normal, which has been exacerbating his pain. Denies any inciting trauma or injury. Denies treating symptoms anything. States he is otherwise well has no further concerns. PAST MEDICAL HISTORY   Past Medical History:  Past Medical History:   Diagnosis Date    Anxiety     GERD (gastroesophageal reflux disease)     Psychiatric disorder     Anxiety, Depression       Past Surgical History:  History reviewed. No pertinent surgical history. Family History:  History reviewed. No pertinent family history. Social History:  Social History     Tobacco Use    Smoking status: Never    Smokeless tobacco: Never   Substance Use Topics    Alcohol use: Never    Drug use: Never       Allergies:  No Known Allergies    PCP: Stormy Gamez DO    Current Meds:   No current facility-administered medications for this encounter.      Current Outpatient Medications   Medication Sig Dispense Refill    ibuprofen (ADVIL;MOTRIN) 800 MG tablet Take 1 tablet by mouth every 8 hours as needed for Pain 20 tablet 0    methocarbamol (ROBAXIN) 750 MG tablet Take 1 tablet by mouth 4 times daily for 5 days 20 tablet 0    lidocaine (HM LIDOCAINE PATCH) 4 % external patch Place 1 patch onto the skin daily 5 each 0       Social Determinants of Health:   Social Determinants of Health

## 2023-10-27 ENCOUNTER — HOSPITAL ENCOUNTER (EMERGENCY)
Facility: HOSPITAL | Age: 23
Discharge: HOME | End: 2023-10-27
Payer: MEDICAID

## 2023-10-27 ENCOUNTER — APPOINTMENT (OUTPATIENT)
Facility: HOSPITAL | Age: 23
End: 2023-10-27
Payer: MEDICAID

## 2023-10-27 VITALS
WEIGHT: 290 LBS | RESPIRATION RATE: 18 BRPM | DIASTOLIC BLOOD PRESSURE: 76 MMHG | TEMPERATURE: 98.9 F | HEIGHT: 72 IN | HEART RATE: 86 BPM | OXYGEN SATURATION: 98 % | BODY MASS INDEX: 39.28 KG/M2 | SYSTOLIC BLOOD PRESSURE: 128 MMHG

## 2023-10-27 DIAGNOSIS — J40 BRONCHITIS: Primary | ICD-10-CM

## 2023-10-27 PROCEDURE — 99284 EMERGENCY DEPT VISIT MOD MDM: CPT

## 2023-10-27 PROCEDURE — 6370000000 HC RX 637 (ALT 250 FOR IP): Performed by: NURSE PRACTITIONER

## 2023-10-27 PROCEDURE — 71046 X-RAY EXAM CHEST 2 VIEWS: CPT

## 2023-10-27 RX ORDER — PREDNISONE 20 MG/1
20 TABLET ORAL DAILY
Qty: 5 TABLET | Refills: 0 | Status: SHIPPED | OUTPATIENT
Start: 2023-10-27 | End: 2023-11-01

## 2023-10-27 RX ORDER — IPRATROPIUM BROMIDE AND ALBUTEROL SULFATE 2.5; .5 MG/3ML; MG/3ML
1 SOLUTION RESPIRATORY (INHALATION)
Status: COMPLETED | OUTPATIENT
Start: 2023-10-27 | End: 2023-10-27

## 2023-10-27 RX ORDER — ALBUTEROL SULFATE 90 UG/1
2 AEROSOL, METERED RESPIRATORY (INHALATION) 4 TIMES DAILY PRN
Qty: 18 G | Refills: 0 | Status: SHIPPED | OUTPATIENT
Start: 2023-10-27

## 2023-10-27 RX ORDER — DEXTROMETHORPHAN HYDROBROMIDE AND PROMETHAZINE HYDROCHLORIDE 15; 6.25 MG/5ML; MG/5ML
5 SYRUP ORAL 4 TIMES DAILY PRN
Qty: 118 ML | Refills: 0 | Status: SHIPPED | OUTPATIENT
Start: 2023-10-27 | End: 2023-11-03

## 2023-10-27 RX ADMIN — IPRATROPIUM BROMIDE AND ALBUTEROL SULFATE 1 DOSE: 2.5; .5 SOLUTION RESPIRATORY (INHALATION) at 18:50

## 2023-10-27 ASSESSMENT — LIFESTYLE VARIABLES
HOW OFTEN DO YOU HAVE A DRINK CONTAINING ALCOHOL: NEVER
HOW MANY STANDARD DRINKS CONTAINING ALCOHOL DO YOU HAVE ON A TYPICAL DAY: PATIENT DOES NOT DRINK

## 2023-10-27 ASSESSMENT — PAIN - FUNCTIONAL ASSESSMENT: PAIN_FUNCTIONAL_ASSESSMENT: NONE - DENIES PAIN

## 2023-10-27 NOTE — ED PROVIDER NOTES
[facility] EMERGENCY DEPT  EMERGENCY DEPARTMENT HISTORY AND PHYSICAL EXAM      Date: 10/27/2023  Patient Name: Yuliya Rojo MRN: [id number]  YOB: 2000  Date of evaluation: 10/27/2023  Provider: JOSE Caal NP   Note Started: 7:34 PM EDT 10/27/23    HISTORY OF PRESENT ILLNESS     Chief Complaint   Patient presents with    Shortness of Breath       History Provided By: Patient    HPI: Yuliya Rojo is a 21 y.o. male With a past medical history as listed below presents to the ER with intermittent shortness of breath. Patient has had fatigue shortness of breath and weakness x2 days. Patient states it started after eating some bad smelling meat    PAST MEDICAL HISTORY   Past Medical History:  Past Medical History:   Diagnosis Date    Anxiety     GERD (gastroesophageal reflux disease)     Psychiatric disorder     Anxiety, Depression       Past Surgical History:  History reviewed. No pertinent surgical history. Family History:  History reviewed. No pertinent family history. Social History:  Social History     Tobacco Use    Smoking status: Never    Smokeless tobacco: Never   Substance Use Topics    Alcohol use: Never    Drug use: Never       Allergies:  No Known Allergies    PCP: Raghavendra Romero DO    Current Meds:   No current facility-administered medications for this encounter.      Current Outpatient Medications   Medication Sig Dispense Refill    albuterol sulfate HFA (VENTOLIN HFA) 108 (90 Base) MCG/ACT inhaler Inhale 2 puffs into the lungs 4 times daily as needed for Wheezing 18 g 0    promethazine-dextromethorphan (PROMETHAZINE-DM) 6.25-15 MG/5ML syrup Take 5 mLs by mouth 4 times daily as needed for Cough 118 mL 0    predniSONE (DELTASONE) 20 MG tablet Take 1 tablet by mouth daily for 5 days 5 tablet 0    ibuprofen (ADVIL;MOTRIN) 800 MG tablet Take 1 tablet by mouth every 8 hours as needed for Pain (Patient not taking: Reported on 10/27/2023) 20 tablet 0    lidocaine (HM

## 2023-10-27 NOTE — ED TRIAGE NOTES
SOB, fatigure, weakness for 2 days. States that he started feeling like this after eating spaghetti that had 'bad smelling meat' in it.  No c/o chst pain, n, v, d

## 2023-10-30 LAB
EKG ATRIAL RATE: 74 BPM
EKG DIAGNOSIS: NORMAL
EKG P AXIS: 0 DEGREES
EKG P-R INTERVAL: 164 MS
EKG Q-T INTERVAL: 394 MS
EKG QRS DURATION: 92 MS
EKG QTC CALCULATION (BAZETT): 437 MS
EKG R AXIS: 44 DEGREES
EKG T AXIS: 214 DEGREES
EKG VENTRICULAR RATE: 74 BPM

## 2023-11-22 ENCOUNTER — HOSPITAL ENCOUNTER (EMERGENCY)
Facility: HOSPITAL | Age: 23
Discharge: HOME | End: 2023-11-22
Attending: EMERGENCY MEDICINE
Payer: MEDICAID

## 2023-11-22 VITALS
WEIGHT: 290 LBS | OXYGEN SATURATION: 96 % | HEIGHT: 72 IN | TEMPERATURE: 100 F | HEART RATE: 111 BPM | RESPIRATION RATE: 18 BRPM | SYSTOLIC BLOOD PRESSURE: 116 MMHG | BODY MASS INDEX: 39.28 KG/M2 | DIASTOLIC BLOOD PRESSURE: 72 MMHG

## 2023-11-22 DIAGNOSIS — J10.1 INFLUENZA A: Primary | ICD-10-CM

## 2023-11-22 LAB
FLUAV AG NPH QL IA: POSITIVE
FLUBV AG NOSE QL IA: NEGATIVE

## 2023-11-22 PROCEDURE — 99283 EMERGENCY DEPT VISIT LOW MDM: CPT

## 2023-11-22 PROCEDURE — 6370000000 HC RX 637 (ALT 250 FOR IP): Performed by: EMERGENCY MEDICINE

## 2023-11-22 PROCEDURE — 87635 SARS-COV-2 COVID-19 AMP PRB: CPT

## 2023-11-22 PROCEDURE — 87804 INFLUENZA ASSAY W/OPTIC: CPT

## 2023-11-22 RX ORDER — IBUPROFEN 600 MG/1
600 TABLET ORAL 3 TIMES DAILY PRN
Qty: 15 TABLET | Refills: 0 | Status: SHIPPED | OUTPATIENT
Start: 2023-11-22 | End: 2023-11-27

## 2023-11-22 RX ORDER — BENZONATATE 200 MG/1
200 CAPSULE ORAL 3 TIMES DAILY PRN
Qty: 15 CAPSULE | Refills: 0 | Status: SHIPPED | OUTPATIENT
Start: 2023-11-22 | End: 2023-11-27

## 2023-11-22 RX ORDER — IBUPROFEN 600 MG/1
600 TABLET ORAL
Status: COMPLETED | OUTPATIENT
Start: 2023-11-22 | End: 2023-11-22

## 2023-11-22 RX ADMIN — IBUPROFEN 600 MG: 600 TABLET, FILM COATED ORAL at 16:24

## 2023-11-22 ASSESSMENT — PAIN SCALES - GENERAL: PAINLEVEL_OUTOF10: 8

## 2023-11-22 ASSESSMENT — PAIN - FUNCTIONAL ASSESSMENT: PAIN_FUNCTIONAL_ASSESSMENT: 0-10

## 2023-11-22 NOTE — ED TRIAGE NOTES
Pt has generalized body aches, cough and headache for the past 3 days.   Took some robitussin with no relief

## 2023-11-22 NOTE — ED PROVIDER NOTES
Statement Selected the window for influenza treatment with Tamiflu. Will prescribe ibuprofen and Tessalon for symptomatic relief. Discussed flu positive. Recommended primary care follow-up and return if worse. ED Course:     ED Course as of 11/22/23 1720   Wed Nov 22, 2023   1624 Influenza A Ag(!): Positive [MARIO]      ED Course User Index  [MARIO] Daine Saint, DO       _________________________________________________________________              Procedures and Critical Care   Performed by: Daine Saint, DO  Procedures       Disposition: Discharged Home    DISCHARGE: At this time, patient is stable and appropriate for discharge home. Patient demonstrates understanding of current diagnoses and is in agreement with the treatment plan. They are advised that while the likelihood of serious underlying condition is low at this point given the evaluation performed today, we cannot fully rule it out. They are advised to immediately return with any new symptoms or worsening of current condition. Any Incidental findings were noted on the patient's discharge paperwork as well as verbally directly to the patient, and the appropriate follow-up was given to the patient as far as instructions on testing needed as well as the timeframe. All questions have been answered. Patient is given educational material regarding their diagnoses, including danger symptoms and when to return to the ED. Diagnosis:   1.  Influenza A          PATIENT REFERRED TO:  Barry Castillo, [address]  [address] [facility] [address] [phone]          [facility] Gregorio Sharif  [address]  [phone]    As needed, If symptoms worsen      DISCHARGE MEDICATIONS:  Discharge Medication List as of 11/22/2023  5:01 PM        START taking these medications    Details   !! ibuprofen (ADVIL;MOTRIN) 600 MG tablet Take 1 tablet by mouth 3 times daily as needed for Pain, Disp-15 tablet, R-0Normal

## 2023-11-24 LAB
SARS-COV-2 RNA RESP QL NAA+PROBE: NOT DETECTED
SOURCE: NORMAL

## 2024-01-24 ENCOUNTER — HOSPITAL ENCOUNTER (EMERGENCY)
Facility: HOSPITAL | Age: 24
Discharge: HOME | End: 2024-01-24
Payer: MEDICAID

## 2024-01-24 VITALS
HEART RATE: 80 BPM | TEMPERATURE: 98.6 F | RESPIRATION RATE: 19 BRPM | WEIGHT: 290 LBS | DIASTOLIC BLOOD PRESSURE: 79 MMHG | BODY MASS INDEX: 39.28 KG/M2 | SYSTOLIC BLOOD PRESSURE: 135 MMHG | HEIGHT: 72 IN | OXYGEN SATURATION: 100 %

## 2024-01-24 DIAGNOSIS — M54.50 ACUTE BILATERAL LOW BACK PAIN WITHOUT SCIATICA: Primary | ICD-10-CM

## 2024-01-24 PROCEDURE — 99284 EMERGENCY DEPT VISIT MOD MDM: CPT

## 2024-01-24 PROCEDURE — 6360000002 HC RX W HCPCS: Performed by: NURSE PRACTITIONER

## 2024-01-24 PROCEDURE — 96372 THER/PROPH/DIAG INJ SC/IM: CPT

## 2024-01-24 RX ORDER — METHOCARBAMOL 750 MG/1
750 TABLET, FILM COATED ORAL 4 TIMES DAILY PRN
Qty: 20 TABLET | Refills: 0 | Status: SHIPPED | OUTPATIENT
Start: 2024-01-24

## 2024-01-24 RX ORDER — KETOROLAC TROMETHAMINE 30 MG/ML
30 INJECTION, SOLUTION INTRAMUSCULAR; INTRAVENOUS ONCE
Status: COMPLETED | OUTPATIENT
Start: 2024-01-24 | End: 2024-01-24

## 2024-01-24 RX ORDER — KETOROLAC TROMETHAMINE 10 MG/1
10 TABLET, FILM COATED ORAL EVERY 6 HOURS PRN
Qty: 20 TABLET | Refills: 0 | Status: SHIPPED | OUTPATIENT
Start: 2024-01-24 | End: 2024-01-29

## 2024-01-24 RX ADMIN — KETOROLAC TROMETHAMINE 30 MG: 30 INJECTION, SOLUTION INTRAMUSCULAR; INTRAVENOUS at 21:05

## 2024-01-24 ASSESSMENT — PAIN DESCRIPTION - LOCATION: LOCATION: BACK

## 2024-01-24 ASSESSMENT — PAIN DESCRIPTION - ORIENTATION: ORIENTATION: RIGHT;LEFT;LOWER

## 2024-01-24 ASSESSMENT — PAIN SCALES - GENERAL: PAINLEVEL_OUTOF10: 8

## 2024-01-24 ASSESSMENT — PAIN DESCRIPTION - PAIN TYPE: TYPE: ACUTE PAIN

## 2024-01-24 ASSESSMENT — PAIN - FUNCTIONAL ASSESSMENT: PAIN_FUNCTIONAL_ASSESSMENT: 0-10

## 2024-01-24 ASSESSMENT — PAIN DESCRIPTION - DESCRIPTORS: DESCRIPTORS: ACHING

## 2024-01-25 NOTE — ED TRIAGE NOTES
Here for Gen lower back pain that started 2 weeks ago  Pt feels like it has to do with work  Works at [employer] for about 1 month  Rates pain 8/10

## 2024-01-25 NOTE — ED PROVIDER NOTES
Applicable, Back Pain: I utilized an evidence-based risk rating tool (CMT) along with my training and experience to weigh the risk of discharge against the risks of further testing, imaging, or hospitalization. At this time I estimate the risks of additional testing, imaging, or hospitalization to be equal to or greater than the risk of discharge. I discussed my risk assessment with the patient and the patient consents to the risk of discharge as well as the risk of uncertainty in estimating outcomes.  Given the symptoms and findings present at this time, the chance of SEA or SCC is so remote that additional testing or imaging is more likely to harm the patient than diagnose SEA. [id number]        Records Reviewed (source and summary of external notes): Prior medical records and Nursing notes    Vitals:    Vitals:    01/24/24 2049   BP: 135/79   Pulse: 80   Resp: 19   Temp: 98.6 °F (37 °C)   TempSrc: Oral   SpO2: 100%   Weight: 131.5 kg (290 lb)   Height: 1.829 m (6')        ED COURSE   The patient presents with back pain without evidence for a more malignant process. Evaluation in the ED was reassuring and benign. No neurological abnormality.    After ED evaluation, and due to the absence of findings that indicate neurologic deficits or alarming physical exam or historical features, the patient is a good candidate for outpatient symptomatic management.    It was explained that back pain of this nature will take time to improve. Instructions were given that the patient returns to the emergency department or call primary physician with any worsening symptoms including but not limited to: numbness or weakness in the extremities/groin or bowel or bladder problems. Instructed were also given to return or call with any worsening symptomatology or questions. Routine discharge counseling was given including the fact that any worsening, changing or persistent symptoms should prompt an immediate call or follow up with

## 2024-05-11 ENCOUNTER — HOSPITAL ENCOUNTER (EMERGENCY)
Facility: HOSPITAL | Age: 24
Discharge: HOME | End: 2024-05-11
Attending: FAMILY MEDICINE
Payer: MEDICAID

## 2024-05-11 ENCOUNTER — APPOINTMENT (OUTPATIENT)
Facility: HOSPITAL | Age: 24
End: 2024-05-11
Payer: MEDICAID

## 2024-05-11 VITALS
HEART RATE: 76 BPM | RESPIRATION RATE: 18 BRPM | SYSTOLIC BLOOD PRESSURE: 147 MMHG | WEIGHT: 300 LBS | HEIGHT: 72 IN | OXYGEN SATURATION: 100 % | TEMPERATURE: 98.5 F | BODY MASS INDEX: 40.63 KG/M2 | DIASTOLIC BLOOD PRESSURE: 86 MMHG

## 2024-05-11 DIAGNOSIS — I10 HYPERTENSION, UNSPECIFIED TYPE: ICD-10-CM

## 2024-05-11 DIAGNOSIS — R07.9 CHEST PAIN, UNSPECIFIED TYPE: Primary | ICD-10-CM

## 2024-05-11 DIAGNOSIS — R94.31 ABNORMAL EKG: ICD-10-CM

## 2024-05-11 LAB
ANION GAP SERPL CALC-SCNC: 11 MMOL/L (ref 5–15)
BASOPHILS # BLD: 0 K/UL (ref 0–0.1)
BASOPHILS NFR BLD: 1 % (ref 0–1)
BUN SERPL-MCNC: 12 MG/DL (ref 6–20)
BUN/CREAT SERPL: 14 (ref 12–20)
CA-I BLD-MCNC: 9.5 MG/DL (ref 8.5–10.1)
CHLORIDE SERPL-SCNC: 104 MMOL/L (ref 97–108)
CO2 SERPL-SCNC: 27 MMOL/L (ref 21–32)
CREAT SERPL-MCNC: 0.88 MG/DL (ref 0.7–1.3)
D DIMER PPP FEU-MCNC: <0.19 MG/L FEU (ref 0.19–0.5)
DIFFERENTIAL METHOD BLD: NORMAL
EOSINOPHIL # BLD: 0.1 K/UL (ref 0–0.4)
EOSINOPHIL NFR BLD: 1 % (ref 0–7)
ERYTHROCYTE [DISTWIDTH] IN BLOOD BY AUTOMATED COUNT: 12.1 % (ref 11.5–14.5)
GLUCOSE SERPL-MCNC: 106 MG/DL (ref 65–100)
HCT VFR BLD AUTO: 40.9 % (ref 36.6–50.3)
HGB BLD-MCNC: 14.2 G/DL (ref 12.1–17)
IMM GRANULOCYTES # BLD AUTO: 0 K/UL (ref 0–0.04)
IMM GRANULOCYTES NFR BLD AUTO: 0 % (ref 0–0.5)
LYMPHOCYTES # BLD: 2.8 K/UL (ref 0.8–3.5)
LYMPHOCYTES NFR BLD: 36 % (ref 12–49)
MCH RBC QN AUTO: 31.7 PG (ref 26–34)
MCHC RBC AUTO-ENTMCNC: 34.7 G/DL (ref 30–36.5)
MCV RBC AUTO: 91.3 FL (ref 80–99)
MONOCYTES # BLD: 0.6 K/UL (ref 0–1)
MONOCYTES NFR BLD: 8 % (ref 5–13)
NEUTS SEG # BLD: 4.2 K/UL (ref 1.8–8)
NEUTS SEG NFR BLD: 54 % (ref 32–75)
PLATELET # BLD AUTO: 235 K/UL (ref 150–400)
PMV BLD AUTO: 12.1 FL (ref 8.9–12.9)
POTASSIUM SERPL-SCNC: 3.7 MMOL/L (ref 3.5–5.1)
RBC # BLD AUTO: 4.48 M/UL (ref 4.1–5.7)
SODIUM SERPL-SCNC: 142 MMOL/L (ref 136–145)
TROPONIN I SERPL HS-MCNC: 23 NG/L (ref 0–76)
WBC # BLD AUTO: 7.8 K/UL (ref 4.1–11.1)

## 2024-05-11 PROCEDURE — 71045 X-RAY EXAM CHEST 1 VIEW: CPT

## 2024-05-11 PROCEDURE — 93005 ELECTROCARDIOGRAM TRACING: CPT | Performed by: FAMILY MEDICINE

## 2024-05-11 PROCEDURE — 80048 BASIC METABOLIC PNL TOTAL CA: CPT

## 2024-05-11 PROCEDURE — 85025 COMPLETE CBC W/AUTO DIFF WBC: CPT

## 2024-05-11 PROCEDURE — 85379 FIBRIN DEGRADATION QUANT: CPT

## 2024-05-11 PROCEDURE — 99285 EMERGENCY DEPT VISIT HI MDM: CPT

## 2024-05-11 PROCEDURE — 36415 COLL VENOUS BLD VENIPUNCTURE: CPT

## 2024-05-11 PROCEDURE — 84484 ASSAY OF TROPONIN QUANT: CPT

## 2024-05-11 ASSESSMENT — PAIN SCALES - GENERAL: PAINLEVEL_OUTOF10: 3

## 2024-05-11 ASSESSMENT — PAIN - FUNCTIONAL ASSESSMENT: PAIN_FUNCTIONAL_ASSESSMENT: 0-10

## 2024-05-11 ASSESSMENT — PAIN DESCRIPTION - LOCATION: LOCATION: CHEST

## 2024-05-11 NOTE — DISCHARGE INSTRUCTIONS
Thank you!  Thank you for allowing me to care for you in the emergency department. It is my goal to provide you with excellent care.  Please fill out the survey that will come to you by mail or email since we listen to your feedback!     Below you will find a list of your tests from today's visit.  Should you have any questions, please do not hesitate to call the emergency department.    Labs  Recent Results (from the past 12 hour(s))   CBC with Auto Differential    Collection Time: 05/11/24 10:22 AM   Result Value Ref Range    WBC 7.8 4.1 - 11.1 K/uL    RBC 4.48 4.10 - 5.70 M/uL    Hemoglobin 14.2 12.1 - 17.0 g/dL    Hematocrit 40.9 36.6 - 50.3 %    MCV 91.3 80.0 - 99.0 FL    MCH 31.7 26.0 - 34.0 PG    MCHC 34.7 30.0 - 36.5 g/dL    RDW 12.1 11.5 - 14.5 %    Platelets 235 150 - 400 K/uL    MPV 12.1 8.9 - 12.9 FL    Neutrophils % 54 32 - 75 %    Lymphocytes % 36 12 - 49 %    Monocytes % 8 5 - 13 %    Eosinophils % 1 0 - 7 %    Basophils % 1 0 - 1 %    Immature Granulocytes % 0 0.0 - 0.5 %    Neutrophils Absolute 4.2 1.8 - 8.0 K/UL    Lymphocytes Absolute 2.8 0.8 - 3.5 K/UL    Monocytes Absolute 0.6 0.0 - 1.0 K/UL    Eosinophils Absolute 0.1 0.0 - 0.4 K/UL    Basophils Absolute 0.0 0.0 - 0.1 K/UL    Immature Granulocytes Absolute 0.0 0.00 - 0.04 K/UL    Differential Type AUTOMATED     Troponin    Collection Time: 05/11/24 10:22 AM   Result Value Ref Range    Troponin, High Sensitivity 23 0 - 76 ng/L   Basic Metabolic Panel    Collection Time: 05/11/24 10:22 AM   Result Value Ref Range    Sodium 142 136 - 145 mmol/L    Potassium 3.7 3.5 - 5.1 mmol/L    Chloride 104 97 - 108 mmol/L    CO2 27 21 - 32 mmol/L    Anion Gap 11 5 - 15 mmol/L    Glucose 106 (H) 65 - 100 mg/dL    BUN 12 6 - 20 mg/dL    Creatinine 0.88 0.70 - 1.30 mg/dL    Bun/Cre Ratio 14 12 - 20      Est, Glom Filt Rate >90 >60 ml/min/1.73m2    Calcium 9.5 8.5 - 10.1 mg/dL   D-Dimer, Quantitative    Collection Time: 05/11/24 10:22 AM   Result Value Ref

## 2024-05-11 NOTE — ED TRIAGE NOTES
Dizziness, SOB, chest pain for a couple of days, No c/o diaphoresis. Also stated that he has had discomfort in upper right abd and thought it may have been something he ate causing all of this

## 2024-05-12 LAB
EKG ATRIAL RATE: 79 BPM
EKG DIAGNOSIS: NORMAL
EKG P AXIS: 37 DEGREES
EKG P-R INTERVAL: 140 MS
EKG Q-T INTERVAL: 392 MS
EKG QRS DURATION: 94 MS
EKG QTC CALCULATION (BAZETT): 449 MS
EKG R AXIS: 49 DEGREES
EKG T AXIS: 221 DEGREES
EKG VENTRICULAR RATE: 79 BPM

## 2024-05-16 NOTE — ED PROVIDER NOTES
EMERGENCY DEPARTMENT HISTORY AND PHYSICAL EXAM      Date: 5/11/2024  Patient Name: Joao Bell Jr.    History of Presenting Illness       History Provided By:     HPI: Joao Bell Jr., is a very pleasant 23 y.o. male presenting to the ED with a chief complaint of chest pain.  States he recently developed chest pain and intermittent shortness of breath 2 days ago.  Pain is achy.  Not entirely sure if this could be more acid reflux stemming from something he ate.  No arm or jaw pain.  No back pain.  No calf pain or swelling.  No pleuritic symptoms.  No fever    Denies any other symptoms at this time.    PCP: Mckayla Ovalles, DO    No current facility-administered medications on file prior to encounter.     Current Outpatient Medications on File Prior to Encounter   Medication Sig Dispense Refill    ketorolac (TORADOL) 10 MG tablet Take 1 tablet by mouth every 6 hours as needed for Pain 20 tablet 0    methocarbamol (ROBAXIN-750) 750 MG tablet Take 1 tablet by mouth 4 times daily as needed (muscle pain) (Patient not taking: Reported on 5/11/2024) 20 tablet 0    albuterol sulfate HFA (VENTOLIN HFA) 108 (90 Base) MCG/ACT inhaler Inhale 2 puffs into the lungs 4 times daily as needed for Wheezing (Patient not taking: Reported on 11/22/2023) 18 g 0       Past History     Past Medical History:  Past Medical History:   Diagnosis Date    Anxiety     GERD (gastroesophageal reflux disease)     Psychiatric disorder     Anxiety, Depression       Past Surgical History:  History reviewed. No pertinent surgical history.    Family History:  History reviewed. No pertinent family history.    Social History:  Social History     Tobacco Use    Smoking status: Never    Smokeless tobacco: Never   Substance Use Topics    Alcohol use: Never    Drug use: Never       Allergies:  No Known Allergies      Review of Systems     Negative unless otherwise stated in HPI    Physical Exam     Physical Exam  Constitutional:       Appearance:

## 2024-09-11 ENCOUNTER — HOSPITAL ENCOUNTER (EMERGENCY)
Facility: HOSPITAL | Age: 24
Discharge: HOME | End: 2024-09-11
Payer: MEDICAID

## 2024-09-11 VITALS
WEIGHT: 315 LBS | BODY MASS INDEX: 42.66 KG/M2 | SYSTOLIC BLOOD PRESSURE: 145 MMHG | OXYGEN SATURATION: 98 % | DIASTOLIC BLOOD PRESSURE: 81 MMHG | HEIGHT: 72 IN | TEMPERATURE: 98.3 F | RESPIRATION RATE: 16 BRPM | HEART RATE: 74 BPM

## 2024-09-11 DIAGNOSIS — K52.9 GASTROENTERITIS: Primary | ICD-10-CM

## 2024-09-11 LAB
ALBUMIN SERPL-MCNC: 3.9 G/DL (ref 3.5–5)
ALBUMIN/GLOB SERPL: 1 (ref 1.1–2.2)
ALP SERPL-CCNC: 58 U/L (ref 45–117)
ALT SERPL-CCNC: 46 U/L (ref 12–78)
ANION GAP SERPL CALC-SCNC: 10 MMOL/L (ref 2–12)
AST SERPL W P-5'-P-CCNC: 59 U/L (ref 15–37)
BASOPHILS # BLD: 0 K/UL (ref 0–0.1)
BASOPHILS NFR BLD: 0 % (ref 0–1)
BILIRUB SERPL-MCNC: 0.4 MG/DL (ref 0.2–1)
BUN SERPL-MCNC: 11 MG/DL (ref 6–20)
BUN/CREAT SERPL: 11 (ref 12–20)
CA-I BLD-MCNC: 9.3 MG/DL (ref 8.5–10.1)
CHLORIDE SERPL-SCNC: 107 MMOL/L (ref 97–108)
CO2 SERPL-SCNC: 28 MMOL/L (ref 21–32)
CREAT SERPL-MCNC: 0.99 MG/DL (ref 0.7–1.3)
DIFFERENTIAL METHOD BLD: NORMAL
EOSINOPHIL # BLD: 0.1 K/UL (ref 0–0.4)
EOSINOPHIL NFR BLD: 1 % (ref 0–7)
ERYTHROCYTE [DISTWIDTH] IN BLOOD BY AUTOMATED COUNT: 12.5 % (ref 11.5–14.5)
GLOBULIN SER CALC-MCNC: 3.8 G/DL (ref 2–4)
GLUCOSE SERPL-MCNC: 111 MG/DL (ref 65–100)
HCT VFR BLD AUTO: 41.2 % (ref 36.6–50.3)
HGB BLD-MCNC: 13.9 G/DL (ref 12.1–17)
IMM GRANULOCYTES # BLD AUTO: 0 K/UL (ref 0–0.04)
IMM GRANULOCYTES NFR BLD AUTO: 0 % (ref 0–0.5)
LYMPHOCYTES # BLD: 2.9 K/UL (ref 0.8–3.5)
LYMPHOCYTES NFR BLD: 33 % (ref 12–49)
MAGNESIUM SERPL-MCNC: 2.1 MG/DL (ref 1.6–2.4)
MCH RBC QN AUTO: 31.2 PG (ref 26–34)
MCHC RBC AUTO-ENTMCNC: 33.7 G/DL (ref 30–36.5)
MCV RBC AUTO: 92.4 FL (ref 80–99)
MONOCYTES # BLD: 0.6 K/UL (ref 0–1)
MONOCYTES NFR BLD: 7 % (ref 5–13)
NEUTS SEG # BLD: 5.2 K/UL (ref 1.8–8)
NEUTS SEG NFR BLD: 59 % (ref 32–75)
PLATELET # BLD AUTO: 236 K/UL (ref 150–400)
PMV BLD AUTO: 11.6 FL (ref 8.9–12.9)
POTASSIUM SERPL-SCNC: 3.8 MMOL/L (ref 3.5–5.1)
PROT SERPL-MCNC: 7.7 G/DL (ref 6.4–8.2)
RBC # BLD AUTO: 4.46 M/UL (ref 4.1–5.7)
SODIUM SERPL-SCNC: 145 MMOL/L (ref 136–145)
WBC # BLD AUTO: 8.9 K/UL (ref 4.1–11.1)

## 2024-09-11 PROCEDURE — 83735 ASSAY OF MAGNESIUM: CPT

## 2024-09-11 PROCEDURE — 99284 EMERGENCY DEPT VISIT MOD MDM: CPT

## 2024-09-11 PROCEDURE — 80053 COMPREHEN METABOLIC PANEL: CPT

## 2024-09-11 PROCEDURE — 96374 THER/PROPH/DIAG INJ IV PUSH: CPT

## 2024-09-11 PROCEDURE — 2580000003 HC RX 258: Performed by: PHYSICIAN ASSISTANT

## 2024-09-11 PROCEDURE — 6360000002 HC RX W HCPCS: Performed by: PHYSICIAN ASSISTANT

## 2024-09-11 PROCEDURE — 96375 TX/PRO/DX INJ NEW DRUG ADDON: CPT

## 2024-09-11 PROCEDURE — 96361 HYDRATE IV INFUSION ADD-ON: CPT

## 2024-09-11 PROCEDURE — 85025 COMPLETE CBC W/AUTO DIFF WBC: CPT

## 2024-09-11 RX ORDER — ONDANSETRON 2 MG/ML
4 INJECTION INTRAMUSCULAR; INTRAVENOUS ONCE
Status: COMPLETED | OUTPATIENT
Start: 2024-09-11 | End: 2024-09-11

## 2024-09-11 RX ORDER — ONDANSETRON 4 MG/1
4 TABLET, ORALLY DISINTEGRATING ORAL EVERY 8 HOURS PRN
Qty: 12 TABLET | Refills: 0 | Status: SHIPPED | OUTPATIENT
Start: 2024-09-11

## 2024-09-11 RX ORDER — FAMOTIDINE 20 MG/1
20 TABLET, FILM COATED ORAL 2 TIMES DAILY
Qty: 60 TABLET | Refills: 3 | Status: SHIPPED | OUTPATIENT
Start: 2024-09-11

## 2024-09-11 RX ORDER — 0.9 % SODIUM CHLORIDE 0.9 %
1000 INTRAVENOUS SOLUTION INTRAVENOUS
Status: COMPLETED | OUTPATIENT
Start: 2024-09-11 | End: 2024-09-11

## 2024-09-11 RX ADMIN — ONDANSETRON 4 MG: 2 INJECTION INTRAMUSCULAR; INTRAVENOUS at 09:34

## 2024-09-11 RX ADMIN — SODIUM CHLORIDE, PRESERVATIVE FREE 40 MG: 5 INJECTION INTRAVENOUS at 09:35

## 2024-09-11 RX ADMIN — SODIUM CHLORIDE 1000 ML: 9 INJECTION, SOLUTION INTRAVENOUS at 09:23

## 2024-09-11 ASSESSMENT — PAIN SCALES - GENERAL: PAINLEVEL_OUTOF10: 0

## 2024-09-11 ASSESSMENT — PAIN - FUNCTIONAL ASSESSMENT: PAIN_FUNCTIONAL_ASSESSMENT: NONE - DENIES PAIN

## 2024-09-13 ENCOUNTER — HOSPITAL ENCOUNTER (EMERGENCY)
Facility: HOSPITAL | Age: 24
Discharge: HOME | End: 2024-09-13
Attending: EMERGENCY MEDICINE
Payer: MEDICAID

## 2024-09-13 VITALS
DIASTOLIC BLOOD PRESSURE: 90 MMHG | WEIGHT: 310 LBS | RESPIRATION RATE: 18 BRPM | TEMPERATURE: 97.9 F | HEART RATE: 96 BPM | SYSTOLIC BLOOD PRESSURE: 148 MMHG | BODY MASS INDEX: 41.99 KG/M2 | HEIGHT: 72 IN | OXYGEN SATURATION: 99 %

## 2024-09-13 DIAGNOSIS — Z20.3 CONTACT WITH OR EXPOSURE TO RABIES: Primary | ICD-10-CM

## 2024-09-13 PROCEDURE — 99282 EMERGENCY DEPT VISIT SF MDM: CPT

## 2024-09-13 ASSESSMENT — PAIN - FUNCTIONAL ASSESSMENT: PAIN_FUNCTIONAL_ASSESSMENT: NONE - DENIES PAIN

## 2024-09-19 ENCOUNTER — HOSPITAL ENCOUNTER (EMERGENCY)
Facility: HOSPITAL | Age: 24
Discharge: HOME | End: 2024-09-19
Attending: EMERGENCY MEDICINE
Payer: MEDICAID

## 2024-09-19 VITALS
BODY MASS INDEX: 41.99 KG/M2 | HEART RATE: 90 BPM | OXYGEN SATURATION: 97 % | DIASTOLIC BLOOD PRESSURE: 97 MMHG | WEIGHT: 310 LBS | TEMPERATURE: 99.7 F | SYSTOLIC BLOOD PRESSURE: 132 MMHG | RESPIRATION RATE: 18 BRPM | HEIGHT: 72 IN

## 2024-09-19 DIAGNOSIS — Z29.14 ENCOUNTER FOR PROPHYLACTIC ADMINISTRATION OF RABIES IMMUNE GLOBULIN: Primary | ICD-10-CM

## 2024-09-19 DIAGNOSIS — Z23 NEED FOR RABIES VACCINATION: ICD-10-CM

## 2024-09-19 PROCEDURE — 6360000002 HC RX W HCPCS: Performed by: EMERGENCY MEDICINE

## 2024-09-19 PROCEDURE — 6360000002 HC RX W HCPCS

## 2024-09-19 PROCEDURE — 96372 THER/PROPH/DIAG INJ SC/IM: CPT

## 2024-09-19 PROCEDURE — 90375 RABIES IG IM/SC: CPT | Performed by: EMERGENCY MEDICINE

## 2024-09-19 PROCEDURE — 90471 IMMUNIZATION ADMIN: CPT

## 2024-09-19 PROCEDURE — 90675 RABIES VACCINE IM: CPT

## 2024-09-19 PROCEDURE — 99284 EMERGENCY DEPT VISIT MOD MDM: CPT

## 2024-09-19 RX ADMIN — RABIES VACCINE 1 ML: KIT at 16:14

## 2024-09-19 RX ADMIN — RABIES IMMUNE GLOBULIN (HUMAN) 2805 UNITS: 300 INJECTION, SOLUTION INFILTRATION; INTRAMUSCULAR at 16:15

## 2024-09-19 ASSESSMENT — PAIN - FUNCTIONAL ASSESSMENT: PAIN_FUNCTIONAL_ASSESSMENT: NONE - DENIES PAIN

## 2024-09-22 ENCOUNTER — HOSPITAL ENCOUNTER (EMERGENCY)
Facility: HOSPITAL | Age: 24
Discharge: HOME | End: 2024-09-22
Attending: STUDENT IN AN ORGANIZED HEALTH CARE EDUCATION/TRAINING PROGRAM
Payer: MEDICAID

## 2024-09-22 VITALS
HEART RATE: 106 BPM | BODY MASS INDEX: 41.99 KG/M2 | HEIGHT: 72 IN | DIASTOLIC BLOOD PRESSURE: 78 MMHG | WEIGHT: 310 LBS | SYSTOLIC BLOOD PRESSURE: 181 MMHG | OXYGEN SATURATION: 97 % | TEMPERATURE: 99 F | RESPIRATION RATE: 16 BRPM

## 2024-09-22 DIAGNOSIS — Z23 NEED FOR PROPHYLACTIC VACCINATION AND INOCULATION AGAINST RABIES: Primary | ICD-10-CM

## 2024-09-22 PROCEDURE — 90675 RABIES VACCINE IM: CPT | Performed by: PHYSICIAN ASSISTANT

## 2024-09-22 PROCEDURE — 90471 IMMUNIZATION ADMIN: CPT | Performed by: PHYSICIAN ASSISTANT

## 2024-09-22 PROCEDURE — 6360000002 HC RX W HCPCS: Performed by: PHYSICIAN ASSISTANT

## 2024-09-22 PROCEDURE — 4500000002 HC ER NO CHARGE

## 2024-09-22 RX ADMIN — RABIES VACCINE 1 ML: KIT at 18:36

## 2024-09-22 ASSESSMENT — PAIN - FUNCTIONAL ASSESSMENT: PAIN_FUNCTIONAL_ASSESSMENT: NONE - DENIES PAIN

## 2024-09-26 ENCOUNTER — HOSPITAL ENCOUNTER (EMERGENCY)
Facility: HOSPITAL | Age: 24
Discharge: HOME | End: 2024-09-26
Attending: EMERGENCY MEDICINE
Payer: MEDICAID

## 2024-09-26 VITALS
OXYGEN SATURATION: 98 % | RESPIRATION RATE: 16 BRPM | BODY MASS INDEX: 41.99 KG/M2 | HEIGHT: 72 IN | SYSTOLIC BLOOD PRESSURE: 173 MMHG | WEIGHT: 310 LBS | DIASTOLIC BLOOD PRESSURE: 82 MMHG | TEMPERATURE: 98.6 F | HEART RATE: 97 BPM

## 2024-09-26 DIAGNOSIS — Z23 NEED FOR PROPHYLACTIC VACCINATION AND INOCULATION AGAINST RABIES: Primary | ICD-10-CM

## 2024-09-26 PROCEDURE — 90471 IMMUNIZATION ADMIN: CPT | Performed by: NURSE PRACTITIONER

## 2024-09-26 PROCEDURE — 90675 RABIES VACCINE IM: CPT | Performed by: NURSE PRACTITIONER

## 2024-09-26 PROCEDURE — 99284 EMERGENCY DEPT VISIT MOD MDM: CPT

## 2024-09-26 PROCEDURE — 6360000002 HC RX W HCPCS: Performed by: NURSE PRACTITIONER

## 2024-09-26 RX ADMIN — RABIES VACCINE 1 ML: KIT at 13:02

## 2024-09-26 ASSESSMENT — ENCOUNTER SYMPTOMS
COLOR CHANGE: 0
NAUSEA: 0
VOMITING: 0
EYE REDNESS: 0
COUGH: 0
ABDOMINAL PAIN: 0
ABDOMINAL DISTENTION: 0

## 2024-09-26 ASSESSMENT — PAIN - FUNCTIONAL ASSESSMENT: PAIN_FUNCTIONAL_ASSESSMENT: NONE - DENIES PAIN

## 2024-10-03 ENCOUNTER — HOSPITAL ENCOUNTER (EMERGENCY)
Facility: HOSPITAL | Age: 24
Discharge: HOME | End: 2024-10-03
Attending: EMERGENCY MEDICINE
Payer: MEDICAID

## 2024-10-03 VITALS
SYSTOLIC BLOOD PRESSURE: 139 MMHG | HEIGHT: 72 IN | RESPIRATION RATE: 18 BRPM | BODY MASS INDEX: 41.99 KG/M2 | DIASTOLIC BLOOD PRESSURE: 88 MMHG | WEIGHT: 310 LBS | HEART RATE: 88 BPM | OXYGEN SATURATION: 97 % | TEMPERATURE: 98 F

## 2024-10-03 DIAGNOSIS — Z23 ENCOUNTER FOR ADMINISTRATION OF VACCINE: Primary | ICD-10-CM

## 2024-10-03 PROCEDURE — 90471 IMMUNIZATION ADMIN: CPT | Performed by: PHYSICIAN ASSISTANT

## 2024-10-03 PROCEDURE — 90675 RABIES VACCINE IM: CPT | Performed by: PHYSICIAN ASSISTANT

## 2024-10-03 PROCEDURE — 99284 EMERGENCY DEPT VISIT MOD MDM: CPT

## 2024-10-03 PROCEDURE — 6360000002 HC RX W HCPCS: Performed by: PHYSICIAN ASSISTANT

## 2024-10-03 RX ADMIN — RABIES VACCINE 1 ML: KIT at 16:42

## 2024-10-03 ASSESSMENT — PAIN - FUNCTIONAL ASSESSMENT: PAIN_FUNCTIONAL_ASSESSMENT: NONE - DENIES PAIN

## 2024-10-03 NOTE — ED PROVIDER NOTES
[facility] EMERGENCY DEPT  EMERGENCY DEPARTMENT ENCOUNTER      Pt Name: Joao Bell Jr.  MRN: [id number]  Birthdate 2000  Date of evaluation: 10/3/2024  Provider: Jonathan Benitez PA-C    CHIEF COMPLAINT       Chief Complaint   Patient presents with    4th rabies vaccine         HISTORY OF PRESENT ILLNESS   (Location/Symptom, Timing/Onset, Context/Setting, Quality, Duration, Modifying Factors, Severity)  Note limiting factors.   Joao Bell Jr. is a 24 y.o. male presenting to ED for fourth and final rabies vaccination.  No other concerns at this time.            Review of External Medical Records:     Nursing Notes were reviewed.    REVIEW OF SYSTEMS    (2-9 systems for level 4, 10 or more for level 5)     Review of Systems   All other systems reviewed and are negative.      Except as noted above the remainder of the review of systems was reviewed and negative.       PAST MEDICAL HISTORY     Past Medical History:   Diagnosis Date    Anxiety     GERD (gastroesophageal reflux disease)     Psychiatric disorder     Anxiety, Depression         SURGICAL HISTORY     History reviewed. No pertinent surgical history.      CURRENT MEDICATIONS       Previous Medications    ALBUTEROL SULFATE HFA (VENTOLIN HFA) 108 (90 BASE) MCG/ACT INHALER    Inhale 2 puffs into the lungs 4 times daily as needed for Wheezing    FAMOTIDINE (PEPCID) 20 MG TABLET    Take 1 tablet by mouth 2 times daily    KETOROLAC (TORADOL) 10 MG TABLET    Take 1 tablet by mouth every 6 hours as needed for Pain    METHOCARBAMOL (ROBAXIN-750) 750 MG TABLET    Take 1 tablet by mouth 4 times daily as needed (muscle pain)    ONDANSETRON (ZOFRAN-ODT) 4 MG DISINTEGRATING TABLET    Take 1 tablet by mouth every 8 hours as needed for Nausea or Vomiting       ALLERGIES     Patient has no known allergies.    FAMILY HISTORY     History reviewed. No pertinent family history.       SOCIAL HISTORY       Social History     Socioeconomic History    Marital status:

## 2024-10-03 NOTE — DISCHARGE INSTRUCTIONS
Return immediately if any new or worsening symptoms.  Thank you for allowing us to be a part of your care.    
72

## 2025-06-06 ENCOUNTER — HOSPITAL ENCOUNTER (EMERGENCY)
Facility: HOSPITAL | Age: 25
Discharge: HOME | End: 2025-06-06
Attending: STUDENT IN AN ORGANIZED HEALTH CARE EDUCATION/TRAINING PROGRAM
Payer: MEDICAID

## 2025-06-06 ENCOUNTER — APPOINTMENT (OUTPATIENT)
Facility: HOSPITAL | Age: 25
End: 2025-06-06
Payer: MEDICAID

## 2025-06-06 VITALS
HEART RATE: 82 BPM | DIASTOLIC BLOOD PRESSURE: 81 MMHG | OXYGEN SATURATION: 97 % | HEIGHT: 72 IN | TEMPERATURE: 98.6 F | WEIGHT: 310 LBS | RESPIRATION RATE: 16 BRPM | BODY MASS INDEX: 41.99 KG/M2 | SYSTOLIC BLOOD PRESSURE: 153 MMHG

## 2025-06-06 DIAGNOSIS — R07.89 ATYPICAL CHEST PAIN: Primary | ICD-10-CM

## 2025-06-06 LAB
ALBUMIN SERPL-MCNC: 3.9 G/DL (ref 3.5–5)
ALBUMIN/GLOB SERPL: 1.1 (ref 1.1–2.2)
ALP SERPL-CCNC: 58 U/L (ref 45–117)
ALT SERPL-CCNC: 34 U/L (ref 12–78)
ANION GAP SERPL CALC-SCNC: 6 MMOL/L (ref 2–12)
AST SERPL W P-5'-P-CCNC: 28 U/L (ref 15–37)
BASOPHILS # BLD: 0.04 K/UL (ref 0–0.1)
BASOPHILS NFR BLD: 0.4 % (ref 0–1)
BILIRUB SERPL-MCNC: 0.3 MG/DL (ref 0.2–1)
BUN SERPL-MCNC: 9 MG/DL (ref 6–20)
BUN/CREAT SERPL: 9 (ref 12–20)
CA-I BLD-MCNC: 9.4 MG/DL (ref 8.5–10.1)
CHLORIDE SERPL-SCNC: 106 MMOL/L (ref 97–108)
CO2 SERPL-SCNC: 29 MMOL/L (ref 21–32)
CREAT SERPL-MCNC: 1.01 MG/DL (ref 0.7–1.3)
DIFFERENTIAL METHOD BLD: NORMAL
EOSINOPHIL # BLD: 0.05 K/UL (ref 0–0.4)
EOSINOPHIL NFR BLD: 0.5 % (ref 0–7)
ERYTHROCYTE [DISTWIDTH] IN BLOOD BY AUTOMATED COUNT: 12.1 % (ref 11.5–14.5)
GLOBULIN SER CALC-MCNC: 3.5 G/DL (ref 2–4)
GLUCOSE SERPL-MCNC: 96 MG/DL (ref 65–100)
HCT VFR BLD AUTO: 41.5 % (ref 36.6–50.3)
HGB BLD-MCNC: 14 G/DL (ref 12.1–17)
IMM GRANULOCYTES # BLD AUTO: 0.01 K/UL (ref 0–0.04)
IMM GRANULOCYTES NFR BLD AUTO: 0.1 % (ref 0–0.5)
LYMPHOCYTES # BLD: 3.2 K/UL (ref 0.8–3.5)
LYMPHOCYTES NFR BLD: 30.8 % (ref 12–49)
MCH RBC QN AUTO: 31 PG (ref 26–34)
MCHC RBC AUTO-ENTMCNC: 33.7 G/DL (ref 30–36.5)
MCV RBC AUTO: 92 FL (ref 80–99)
MONOCYTES # BLD: 0.8 K/UL (ref 0–1)
MONOCYTES NFR BLD: 7.7 % (ref 5–13)
NEUTS SEG # BLD: 6.29 K/UL (ref 1.8–8)
NEUTS SEG NFR BLD: 60.5 % (ref 32–75)
PLATELET # BLD AUTO: 252 K/UL (ref 150–400)
PMV BLD AUTO: 11.8 FL (ref 8.9–12.9)
POTASSIUM SERPL-SCNC: 3.8 MMOL/L (ref 3.5–5.1)
PROT SERPL-MCNC: 7.4 G/DL (ref 6.4–8.2)
RBC # BLD AUTO: 4.51 M/UL (ref 4.1–5.7)
SODIUM SERPL-SCNC: 141 MMOL/L (ref 136–145)
TROPONIN I SERPL HS-MCNC: 29 NG/L (ref 0–76)
WBC # BLD AUTO: 10.4 K/UL (ref 4.1–11.1)

## 2025-06-06 PROCEDURE — 36415 COLL VENOUS BLD VENIPUNCTURE: CPT

## 2025-06-06 PROCEDURE — 71045 X-RAY EXAM CHEST 1 VIEW: CPT

## 2025-06-06 PROCEDURE — 80053 COMPREHEN METABOLIC PANEL: CPT

## 2025-06-06 PROCEDURE — 85025 COMPLETE CBC W/AUTO DIFF WBC: CPT

## 2025-06-06 PROCEDURE — 99285 EMERGENCY DEPT VISIT HI MDM: CPT

## 2025-06-06 PROCEDURE — 93005 ELECTROCARDIOGRAM TRACING: CPT | Performed by: STUDENT IN AN ORGANIZED HEALTH CARE EDUCATION/TRAINING PROGRAM

## 2025-06-06 PROCEDURE — 84484 ASSAY OF TROPONIN QUANT: CPT

## 2025-06-06 RX ORDER — IBUPROFEN 600 MG/1
600 TABLET, FILM COATED ORAL 3 TIMES DAILY PRN
Qty: 30 TABLET | Refills: 0 | Status: SHIPPED | OUTPATIENT
Start: 2025-06-06

## 2025-06-06 ASSESSMENT — PAIN SCALES - GENERAL
PAINLEVEL_OUTOF10: 3
PAINLEVEL_OUTOF10: 1

## 2025-06-06 ASSESSMENT — HEART SCORE: ECG: NON-SPECIFC REPOLARIZATION DISTURBANCE/LBTB/PM

## 2025-06-06 ASSESSMENT — PAIN - FUNCTIONAL ASSESSMENT: PAIN_FUNCTIONAL_ASSESSMENT: 0-10

## 2025-06-06 NOTE — ED TRIAGE NOTES
Pt reports for the past couple of weeks he is having chest pain, abd pain, and jaw pain. States he was told in 2023 that he has a swollen valve. Last saw a cardiologist in September.

## 2025-06-06 NOTE — ED PROVIDER NOTES
[facility] EMERGENCY DEPT  EMERGENCY DEPARTMENT HISTORY AND PHYSICAL EXAM      Date of evaluation: 6/6/2025  Patient Name: Joao Bell Jr.  Birthdate 2000  MRN: [id number]  ED Provider: Javan Palmer MD   Note Started: 6:48 PM EDT 6/6/25    HISTORY OF PRESENT ILLNESS     Chief Complaint   Patient presents with    Chest Pain       History Provided By: Patient, only     HPI: Joao Bell Jr. is a 24 y.o. male history of LVH, anxiety, GERD, presents the emerged part for evaluation of chest pain.  Patient states that he has been having ongoing pain for several weeks.  Describes as sharp aching pain left-sided lateral chest wall with some radiation to abdomen jaw, denies any exertional component denies any shortness of breath no recent fevers chills.  Currently no active chest pain    PAST MEDICAL HISTORY   Past Medical History:  Past Medical History:   Diagnosis Date    Anxiety     GERD (gastroesophageal reflux disease)     Psychiatric disorder     Anxiety, Depression       Past Surgical History:  No past surgical history on file.    Family History:  No family history on file.    Social History:  Social History     Tobacco Use    Smoking status: Never    Smokeless tobacco: Never   Substance Use Topics    Alcohol use: Never    Drug use: Never       Allergies:  No Known Allergies    PCP: Mckayla Ovalles DO    Current Meds:   No current facility-administered medications for this encounter.     Current Outpatient Medications   Medication Sig Dispense Refill    ibuprofen (ADVIL;MOTRIN) 600 MG tablet Take 1 tablet by mouth 3 times daily as needed for Pain 30 tablet 0    ondansetron (ZOFRAN-ODT) 4 MG disintegrating tablet Take 1 tablet by mouth every 8 hours as needed for Nausea or Vomiting (Patient not taking: Reported on 10/3/2024) 12 tablet 0    famotidine (PEPCID) 20 MG tablet Take 1 tablet by mouth 2 times daily (Patient not taking: Reported on 10/3/2024) 60 tablet 3    ketorolac (TORADOL) 10 MG tablet Take 1  Javan Palmer MD (electronically signed)    (Please note that parts of this dictation were completed with voice recognition software. Quite often unanticipated grammatical, syntax, homophones, and other interpretive errors are inadvertently transcribed by the computer software. Please disregards these errors. Please excuse any errors that have escaped final proofreading.)     Javan Palmer MD  06/08/25 0819

## 2025-06-09 LAB
EKG ATRIAL RATE: 76 BPM
EKG DIAGNOSIS: NORMAL
EKG P AXIS: 2 DEGREES
EKG P-R INTERVAL: 164 MS
EKG Q-T INTERVAL: 418 MS
EKG QRS DURATION: 98 MS
EKG QTC CALCULATION (BAZETT): 470 MS
EKG R AXIS: 47 DEGREES
EKG T AXIS: 224 DEGREES
EKG VENTRICULAR RATE: 76 BPM

## 2025-06-09 PROCEDURE — 93010 ELECTROCARDIOGRAM REPORT: CPT | Performed by: SPECIALIST

## 2025-06-14 ENCOUNTER — HOSPITAL ENCOUNTER (EMERGENCY)
Facility: HOSPITAL | Age: 25
Discharge: HOME | End: 2025-06-14
Payer: MEDICAID

## 2025-06-14 VITALS
TEMPERATURE: 98.5 F | SYSTOLIC BLOOD PRESSURE: 153 MMHG | OXYGEN SATURATION: 99 % | RESPIRATION RATE: 20 BRPM | HEIGHT: 72 IN | HEART RATE: 82 BPM | BODY MASS INDEX: 41.99 KG/M2 | WEIGHT: 310 LBS | DIASTOLIC BLOOD PRESSURE: 80 MMHG

## 2025-06-14 DIAGNOSIS — R07.89 ATYPICAL CHEST PAIN: Primary | ICD-10-CM

## 2025-06-14 DIAGNOSIS — R10.13 ABDOMINAL PAIN, EPIGASTRIC: ICD-10-CM

## 2025-06-14 LAB
ALBUMIN/GLOB SERPL: NORMAL (ref 1.1–2.2)
ALP SERPL-CCNC: 54 U/L (ref 45–117)
ALT SERPL-CCNC: 33 U/L (ref 12–78)
ANION GAP BLD CALC-SCNC: 13
ANION GAP SERPL CALC-SCNC: NORMAL MMOL/L (ref 2–12)
AST SERPL W P-5'-P-CCNC: 27 U/L (ref 15–37)
BASOPHILS # BLD: 0.05 K/UL (ref 0–0.1)
BASOPHILS NFR BLD: 0.5 % (ref 0–1)
BUN SERPL-MCNC: 12 MG/DL (ref 6–20)
BUN/CREAT SERPL: 12 (ref 12–20)
CA-I BLD-MCNC: 1.2 MMOL/L (ref 1.12–1.32)
CA-I BLD-MCNC: 9.8 MG/DL (ref 8.5–10.1)
CHLORIDE BLD-SCNC: 105 MMOL/L (ref 98–107)
CHLORIDE SERPL-SCNC: 105 MMOL/L (ref 97–108)
CO2 BLD-SCNC: 27 MMOL/L
CREAT SERPL-MCNC: 0.98 MG/DL (ref 0.7–1.3)
CREAT UR-MCNC: 0.79 MG/DL (ref 0.6–1.3)
DIFFERENTIAL METHOD BLD: NORMAL
EOSINOPHIL # BLD: 0.03 K/UL (ref 0–0.4)
EOSINOPHIL NFR BLD: 0.3 % (ref 0–7)
ERYTHROCYTE [DISTWIDTH] IN BLOOD BY AUTOMATED COUNT: 12 % (ref 11.5–14.5)
GLOBULIN SER CALC-MCNC: NORMAL G/DL (ref 2–4)
GLUCOSE BLD STRIP.AUTO-MCNC: 99 MG/DL (ref 65–100)
GLUCOSE SERPL-MCNC: 97 MG/DL (ref 65–100)
HCT VFR BLD AUTO: 40.7 % (ref 36.6–50.3)
HGB BLD-MCNC: 13.8 G/DL (ref 12.1–17)
IMM GRANULOCYTES # BLD AUTO: 0.01 K/UL (ref 0–0.04)
IMM GRANULOCYTES NFR BLD AUTO: 0.1 % (ref 0–0.5)
LIPASE SERPL-CCNC: 15 U/L (ref 13–75)
LYMPHOCYTES # BLD: 2.6 K/UL (ref 0.8–3.5)
LYMPHOCYTES NFR BLD: 25.1 % (ref 12–49)
MCH RBC QN AUTO: 30.8 PG (ref 26–34)
MCHC RBC AUTO-ENTMCNC: 33.9 G/DL (ref 30–36.5)
MCV RBC AUTO: 90.8 FL (ref 80–99)
MONOCYTES # BLD: 0.73 K/UL (ref 0–1)
MONOCYTES NFR BLD: 7 % (ref 5–13)
NEUTS SEG # BLD: 6.94 K/UL (ref 1.8–8)
NEUTS SEG NFR BLD: 67 % (ref 32–75)
PLATELET # BLD AUTO: 236 K/UL (ref 150–400)
PMV BLD AUTO: 12.1 FL (ref 8.9–12.9)
POTASSIUM BLD-SCNC: 3.5 MMOL/L (ref 3.5–5.5)
POTASSIUM SERPL-SCNC: 3.7 MMOL/L (ref 3.5–5.1)
PROT SERPL-MCNC: 7.5 G/DL (ref 6.4–8.2)
RBC # BLD AUTO: 4.48 M/UL (ref 4.1–5.7)
SODIUM BLD-SCNC: 144 MMOL/L (ref 136–145)
SODIUM SERPL-SCNC: 142 MMOL/L (ref 136–145)
TROPONIN I SERPL HS-MCNC: 45 NG/L (ref 0–76)
WBC # BLD AUTO: 10.4 K/UL (ref 4.1–11.1)

## 2025-06-14 PROCEDURE — 80047 BASIC METABLC PNL IONIZED CA: CPT

## 2025-06-14 PROCEDURE — 84484 ASSAY OF TROPONIN QUANT: CPT

## 2025-06-14 PROCEDURE — 93005 ELECTROCARDIOGRAM TRACING: CPT | Performed by: STUDENT IN AN ORGANIZED HEALTH CARE EDUCATION/TRAINING PROGRAM

## 2025-06-14 PROCEDURE — 36415 COLL VENOUS BLD VENIPUNCTURE: CPT

## 2025-06-14 PROCEDURE — 83690 ASSAY OF LIPASE: CPT

## 2025-06-14 PROCEDURE — 99284 EMERGENCY DEPT VISIT MOD MDM: CPT

## 2025-06-14 PROCEDURE — 80053 COMPREHEN METABOLIC PANEL: CPT

## 2025-06-14 PROCEDURE — 85025 COMPLETE CBC W/AUTO DIFF WBC: CPT

## 2025-06-14 RX ORDER — FAMOTIDINE 20 MG/1
20 TABLET, FILM COATED ORAL 2 TIMES DAILY
Qty: 60 TABLET | Refills: 0 | Status: SHIPPED | OUTPATIENT
Start: 2025-06-14

## 2025-06-14 RX ORDER — OMEPRAZOLE 20 MG/1
20 CAPSULE, DELAYED RELEASE ORAL
Qty: 60 CAPSULE | Refills: 0 | Status: SHIPPED | OUTPATIENT
Start: 2025-06-14

## 2025-06-14 ASSESSMENT — PAIN - FUNCTIONAL ASSESSMENT: PAIN_FUNCTIONAL_ASSESSMENT: 0-10

## 2025-06-14 ASSESSMENT — HEART SCORE: ECG: NORMAL

## 2025-06-14 ASSESSMENT — PAIN DESCRIPTION - LOCATION: LOCATION: CHEST;ABDOMEN;BACK

## 2025-06-14 ASSESSMENT — PAIN SCALES - GENERAL: PAINLEVEL_OUTOF10: 4

## 2025-06-14 NOTE — ED PROVIDER NOTES
(10/27/2023)    Interpersonal Safety Domain Source: IP Abuse Screening     Physical abuse: Denies     Verbal abuse: Denies     Emotional abuse: Denies     Financial abuse: Denies     Sexual abuse: Denies   Utilities: Not on file     PHYSICAL EXAM   Physical Exam  Vitals reviewed.   Constitutional:       Appearance: He is well-developed.   HENT:      Head: Normocephalic and atraumatic.   Cardiovascular:      Rate and Rhythm: Normal rate and regular rhythm.   Pulmonary:      Effort: Pulmonary effort is normal.      Breath sounds: Normal breath sounds. No decreased breath sounds, wheezing, rhonchi or rales.   Chest:      Chest wall: No tenderness or crepitus.   Abdominal:      Palpations: Abdomen is soft.      Tenderness: There is abdominal tenderness (mild, epigastric). There is no guarding or rebound.   Musculoskeletal:         General: Normal range of motion.   Skin:     General: Skin is warm.      Capillary Refill: Capillary refill takes less than 2 seconds.   Neurological:      General: No focal deficit present.      Mental Status: He is alert.   Psychiatric:         Mood and Affect: Mood is anxious.         SCREENINGS   History: 0  EC  Patient Age: 1  *Risk factors for Atherosclerotic disease: Obesity; Hypertension  Risk Factors: 1  Troponin: 0  Heart Score Total: 1              No data recorded       LAB, EKG AND DIAGNOSTIC RESULTS   Labs:  Recent Results (from the past 12 hours)   Troponin    Collection Time: 25  6:30 PM   Result Value Ref Range    Troponin, High Sensitivity 45 0 - 76 ng/L   CBC with Auto Differential    Collection Time: 25  6:30 PM   Result Value Ref Range    WBC 10.4 4.1 - 11.1 K/uL    RBC 4.48 4.10 - 5.70 M/uL    Hemoglobin 13.8 12.1 - 17.0 g/dL    Hematocrit 40.7 36.6 - 50.3 %    MCV 90.8 80.0 - 99.0 FL    MCH 30.8 26.0 - 34.0 PG    MCHC 33.9 30.0 - 36.5 g/dL    RDW 12.0 11.5 - 14.5 %    Platelets 236 150 - 400 K/uL    MPV 12.1 8.9 - 12.9 FL    Neutrophils % 67.0 32.0 - 75.0  Applicable    MEDICAL DECISION MAKING and ED COURSE   9:04 PM Differential and Considerations of tests not ordered: 24-year-old male with past medical history of anxiety, GERD, and left ventricular hypertrophy presents to the ED with ongoing intermittent chest pain, left arm pain, and upper abdominal pain times weeks.  Patient has an upcoming visit with his cardiologist, at [facility].  He states that the chest pain is not associated with exertion or intake of specific foods.  He reports he does not lift weights or do any movements that may cause chest wall pain.  He has no risk factors for PE and DVT.  Differential diagnosis includes chest wall pain, anxiety, cardiac dysrhythmia, STEMI.   Will obtain basic lab work including a CBC and CMP to evaluate for sepsis, anemia, and electrolyte abnormalities.  Will also order troponin and an EKG to evaluate for ACS following a low suspicion at this time.  I consider chest x-ray, however the patient had a chest x-ray on the 6/6/25 and he notes that his symptoms have not changed since then.    EKG unchanged from last week.  Shows normal sinus rhythm with sinus arrhythmia at 80 bpm.  Left ventricular hypertrophy noted.    Patient's labs are all reassuring.  He is stable for discharge home.  I recommended that he follow-up with his cardiologist as soon as possible for further evaluation and management.  I also explained that it is possible there is an anxiety component to this and to discuss further with his primary care.  I will send in famotidine and omeprazole as the patient does endorse GERD.  Discussed strict return precautions with the patient who verbalized understanding.     Vitals:    Vitals:    06/14/25 1748 06/14/25 1845 06/14/25 1930   BP: (!) 144/76 138/65 (!) 153/80   Pulse: 93 72 82   Resp: 18 20 20   Temp: 98.5 °F (36.9 °C)     TempSrc: Oral     SpO2: 99% 98% 99%   Weight: (!) 140.6 kg (310 lb)     Height: 1.829 m (6')         ED COURSE  ED Course as

## 2025-06-14 NOTE — DISCHARGE INSTRUCTIONS
.    Thank you for choosing our Emergency Department for your care.  It is our privilege to care for you in your time of need.  In the next several days, you may receive a survey via email or mailed to your home about your experience with our team.  We would greatly appreciate you taking a few minutes to complete the survey, as we use this information to learn what we have done well and what we could be doing better. Thank you for trusting us with your care!    Below you will find a list of your tests from today's visit.   Labs and Radiology Studies  Recent Results (from the past 12 hours)   Troponin    Collection Time: 06/14/25  6:30 PM   Result Value Ref Range    Troponin, High Sensitivity 45 0 - 76 ng/L   CBC with Auto Differential    Collection Time: 06/14/25  6:30 PM   Result Value Ref Range    WBC 10.4 4.1 - 11.1 K/uL    RBC 4.48 4.10 - 5.70 M/uL    Hemoglobin 13.8 12.1 - 17.0 g/dL    Hematocrit 40.7 36.6 - 50.3 %    MCV 90.8 80.0 - 99.0 FL    MCH 30.8 26.0 - 34.0 PG    MCHC 33.9 30.0 - 36.5 g/dL    RDW 12.0 11.5 - 14.5 %    Platelets 236 150 - 400 K/uL    MPV 12.1 8.9 - 12.9 FL    Neutrophils % 67.0 32.0 - 75.0 %    Lymphocytes % 25.1 12.0 - 49.0 %    Monocytes % 7.0 5.0 - 13.0 %    Eosinophils % 0.3 0.0 - 7.0 %    Basophils % 0.5 0.0 - 1.0 %    Immature Granulocytes % 0.1 0.0 - 0.5 %    Neutrophils Absolute 6.94 1.80 - 8.00 K/UL    Lymphocytes Absolute 2.60 0.80 - 3.50 K/UL    Monocytes Absolute 0.73 0.00 - 1.00 K/UL    Eosinophils Absolute 0.03 0.00 - 0.40 K/UL    Basophils Absolute 0.05 0.00 - 0.10 K/UL    Immature Granulocytes Absolute 0.01 0.00 - 0.04 K/UL    Differential Type AUTOMATED     Comprehensive Metabolic Panel    Collection Time: 06/14/25  6:30 PM   Result Value Ref Range    Sodium 142 136 - 145 mmol/L    Potassium 3.7 3.5 - 5.1 mmol/L    Chloride 105 97 - 108 mmol/L    Anion Gap Not calculated 2 - 12 mmol/L    Glucose 97 65 - 100 mg/dL    BUN 12 6 - 20 mg/dL    Creatinine 0.98 0.70 - 1.30  mg/dL    BUN/Creatinine Ratio 12 12 - 20      Est, Glom Filt Rate >90 >60 ml/min/1.73m2    Calcium 9.8 8.5 - 10.1 mg/dL    AST 27 15 - 37 U/L    ALT 33 12 - 78 U/L    Alk Phosphatase 54 45 - 117 U/L    Total Protein 7.5 6.4 - 8.2 g/dL    Globulin Not calculated 2.0 - 4.0 g/dL    Albumin/Globulin Ratio Not calculated 1.1 - 2.2     Lipase    Collection Time: 06/14/25  6:30 PM   Result Value Ref Range    Lipase 15 13 - 75 U/L   POC CHEM 8    Collection Time: 06/14/25  6:31 PM   Result Value Ref Range    POC TCO2 27 MMOL/L    POC Glucose 99 65 - 100 mg/dL    POC Creatinine 0.79 0.6 - 1.3 MG/DL    eGFR, POC >90 >60 ml/min/1.73m2    POC Sodium 144 136 - 145 mmol/L    POC Potassium 3.5 3.5 - 5.5 mmol/L    POC Ionized Calcium 1.20 1.12 - 1.32 mmol/L    POC Chloride 105 98 - 107 MMOL/L    Anion Gap, POC 13       No results found.  ------------------------------------------------------------------------------------------------------------  The evaluation and treatment you received in the Emergency Department were for an urgent problem. It is important that you follow-up with a doctor, nurse practitioner, or physician assistant to:  (1) confirm your diagnosis,  (2) re-evaluation of changes in your illness and treatment, and (3) for ongoing care. Please take your discharge instructions with you when you go to your follow-up appointment.     If you have any problem arranging a follow-up appointment, contact us!  If your symptoms become worse or you do not improve as expected, please return to us. We are available 24 hours a day.     If a prescription has been provided, please fill it as soon as possible to prevent a delay in treatment. If you have any questions or reservations about taking the medication due to side effects or interactions with other medications, please call your primary care provider or contact us directly.  Again, THANK YOU for choosing us to care for YOU!

## 2025-06-16 ENCOUNTER — HOSPITAL ENCOUNTER (EMERGENCY)
Facility: HOSPITAL | Age: 25
Discharge: HOME | End: 2025-06-16
Attending: EMERGENCY MEDICINE
Payer: MEDICAID

## 2025-06-16 ENCOUNTER — APPOINTMENT (OUTPATIENT)
Facility: HOSPITAL | Age: 25
End: 2025-06-16
Payer: MEDICAID

## 2025-06-16 VITALS
BODY MASS INDEX: 41.99 KG/M2 | HEIGHT: 72 IN | TEMPERATURE: 97.8 F | OXYGEN SATURATION: 99 % | HEART RATE: 64 BPM | RESPIRATION RATE: 16 BRPM | SYSTOLIC BLOOD PRESSURE: 147 MMHG | DIASTOLIC BLOOD PRESSURE: 76 MMHG | WEIGHT: 310 LBS

## 2025-06-16 DIAGNOSIS — R07.9 CHEST PAIN, UNSPECIFIED TYPE: Primary | ICD-10-CM

## 2025-06-16 DIAGNOSIS — F41.9 ANXIETY: ICD-10-CM

## 2025-06-16 LAB
ALBUMIN SERPL-MCNC: 4.2 G/DL (ref 3.5–5)
ALBUMIN/GLOB SERPL: 1.1 (ref 1.1–2.2)
ALP SERPL-CCNC: 59 U/L (ref 45–117)
ALT SERPL-CCNC: 29 U/L (ref 12–78)
ANION GAP SERPL CALC-SCNC: 7 MMOL/L (ref 2–12)
AST SERPL W P-5'-P-CCNC: 24 U/L (ref 15–37)
BASOPHILS # BLD: 0.11 K/UL (ref 0–0.1)
BASOPHILS NFR BLD: 1 % (ref 0–1)
BILIRUB SERPL-MCNC: 0.4 MG/DL (ref 0.2–1)
BUN SERPL-MCNC: 13 MG/DL (ref 6–20)
BUN/CREAT SERPL: 11 (ref 12–20)
CA-I BLD-MCNC: 9.6 MG/DL (ref 8.5–10.1)
CHLORIDE SERPL-SCNC: 111 MMOL/L (ref 97–108)
CO2 SERPL-SCNC: 25 MMOL/L (ref 21–32)
CREAT SERPL-MCNC: 1.23 MG/DL (ref 0.7–1.3)
DIFFERENTIAL METHOD BLD: ABNORMAL
EKG ATRIAL RATE: 64 BPM
EKG ATRIAL RATE: 80 BPM
EKG DIAGNOSIS: NORMAL
EKG DIAGNOSIS: NORMAL
EKG P AXIS: 1 DEGREES
EKG P AXIS: 46 DEGREES
EKG P-R INTERVAL: 158 MS
EKG P-R INTERVAL: 170 MS
EKG Q-T INTERVAL: 406 MS
EKG Q-T INTERVAL: 432 MS
EKG QRS DURATION: 92 MS
EKG QRS DURATION: 98 MS
EKG QTC CALCULATION (BAZETT): 445 MS
EKG QTC CALCULATION (BAZETT): 468 MS
EKG R AXIS: 43 DEGREES
EKG R AXIS: 53 DEGREES
EKG T AXIS: 224 DEGREES
EKG T AXIS: 232 DEGREES
EKG VENTRICULAR RATE: 64 BPM
EKG VENTRICULAR RATE: 80 BPM
EOSINOPHIL # BLD: 0.22 K/UL (ref 0–0.4)
EOSINOPHIL NFR BLD: 2 % (ref 0–7)
ERYTHROCYTE [DISTWIDTH] IN BLOOD BY AUTOMATED COUNT: 12.2 % (ref 11.5–14.5)
GLOBULIN SER CALC-MCNC: 3.7 G/DL (ref 2–4)
GLUCOSE SERPL-MCNC: 109 MG/DL (ref 65–100)
HCT VFR BLD AUTO: 40.4 % (ref 36.6–50.3)
HGB BLD-MCNC: 13.9 G/DL (ref 12.1–17)
IMM GRANULOCYTES # BLD AUTO: 0 K/UL
IMM GRANULOCYTES NFR BLD AUTO: 0 %
LYMPHOCYTES # BLD: 3.81 K/UL (ref 0.8–3.5)
LYMPHOCYTES NFR BLD: 34 % (ref 12–49)
MCH RBC QN AUTO: 30.6 PG (ref 26–34)
MCHC RBC AUTO-ENTMCNC: 34.4 G/DL (ref 30–36.5)
MCV RBC AUTO: 89 FL (ref 80–99)
MONOCYTES # BLD: 0.45 K/UL (ref 0–1)
MONOCYTES NFR BLD: 4 % (ref 5–13)
NEUTS SEG # BLD: 6.61 K/UL (ref 1.8–8)
NEUTS SEG NFR BLD: 59 % (ref 32–75)
NRBC BLD-RTO: 0 PER 100 WBC
PLATELET # BLD AUTO: 236 K/UL (ref 150–400)
PMV BLD AUTO: 12 FL (ref 8.9–12.9)
POTASSIUM SERPL-SCNC: 3.4 MMOL/L (ref 3.5–5.1)
PROT SERPL-MCNC: 7.9 G/DL (ref 6.4–8.2)
RBC # BLD AUTO: 4.54 M/UL (ref 4.1–5.7)
RBC MORPH BLD: ABNORMAL
SODIUM SERPL-SCNC: 143 MMOL/L (ref 136–145)
TROPONIN I SERPL HS-MCNC: 30 NG/L (ref 0–76)
TROPONIN I SERPL HS-MCNC: 32 NG/L (ref 0–76)
WBC # BLD AUTO: 11.2 K/UL (ref 4.1–11.1)
WBC MORPH BLD: ABNORMAL

## 2025-06-16 PROCEDURE — 71045 X-RAY EXAM CHEST 1 VIEW: CPT

## 2025-06-16 PROCEDURE — 84484 ASSAY OF TROPONIN QUANT: CPT

## 2025-06-16 PROCEDURE — 93005 ELECTROCARDIOGRAM TRACING: CPT | Performed by: EMERGENCY MEDICINE

## 2025-06-16 PROCEDURE — 6370000000 HC RX 637 (ALT 250 FOR IP)

## 2025-06-16 PROCEDURE — 85025 COMPLETE CBC W/AUTO DIFF WBC: CPT

## 2025-06-16 PROCEDURE — 36415 COLL VENOUS BLD VENIPUNCTURE: CPT

## 2025-06-16 PROCEDURE — 99285 EMERGENCY DEPT VISIT HI MDM: CPT

## 2025-06-16 PROCEDURE — 80053 COMPREHEN METABOLIC PANEL: CPT

## 2025-06-16 RX ORDER — LORAZEPAM 1 MG/1
1 TABLET ORAL ONCE
Status: DISCONTINUED | OUTPATIENT
Start: 2025-06-16 | End: 2025-06-16 | Stop reason: HOSPADM

## 2025-06-16 RX ADMIN — NITROGLYCERIN 1 INCH: 20 OINTMENT TOPICAL at 05:38

## 2025-06-16 ASSESSMENT — PAIN - FUNCTIONAL ASSESSMENT: PAIN_FUNCTIONAL_ASSESSMENT: 0-10

## 2025-06-16 ASSESSMENT — PAIN SCALES - GENERAL: PAINLEVEL_OUTOF10: 5

## 2025-06-16 NOTE — DISCHARGE INSTRUCTIONS
Thank you for choosing our Emergency Department for your care.  It is our privilege to care for you in your time of need.  In the next several days, you may receive a survey via email or mailed to your home about your experience with our team.  We would greatly appreciate you taking a few minutes to complete the survey, as we use this information to learn what we have done well and what we could be doing better. Thank you for trusting us with your care!    Below you will find a list of your tests from today's visit.   Labs and Radiology Studies  Recent Results (from the past 12 hours)   CBC with Auto Differential    Collection Time: 06/16/25  5:33 AM   Result Value Ref Range    WBC 11.2 (H) 4.1 - 11.1 K/uL    RBC 4.54 4.10 - 5.70 M/uL    Hemoglobin 13.9 12.1 - 17.0 g/dL    Hematocrit 40.4 36.6 - 50.3 %    MCV 89.0 80.0 - 99.0 FL    MCH 30.6 26.0 - 34.0 PG    MCHC 34.4 30.0 - 36.5 g/dL    RDW 12.2 11.5 - 14.5 %    Platelets 236 150 - 400 K/uL    MPV 12.0 8.9 - 12.9 FL    Nucleated RBCs 0.0 0.0  WBC    Neutrophils % 59.0 32 - 75 %    Lymphocytes % 34.0 12 - 49 %    Monocytes % 4.0 (L) 5 - 13 %    Eosinophils % 2.0 0 - 7 %    Basophils % 1.0 0 - 1 %    Immature Granulocytes % 0.0 %    Neutrophils Absolute 6.61 1.8 - 8.0 K/UL    Lymphocytes Absolute 3.81 (H) 0.8 - 3.5 K/UL    Monocytes Absolute 0.45 0.0 - 1.0 K/UL    Eosinophils Absolute 0.22 0.0 - 0.4 K/UL    Basophils Absolute 0.11 (H) 0.0 - 0.1 K/UL    Immature Granulocytes Absolute 0.00 K/UL    Differential Type Manual      RBC Comment Normocytic, Normochromic      WBC Comment Atypical lymphs     Comprehensive Metabolic Panel    Collection Time: 06/16/25  5:33 AM   Result Value Ref Range    Sodium 143 136 - 145 mmol/L    Potassium 3.4 (L) 3.5 - 5.1 mmol/L    Chloride 111 (H) 97 - 108 mmol/L    CO2 25 21 - 32 mmol/L    Anion Gap 7 2 - 12 mmol/L    Glucose 109 (H) 65 - 100 mg/dL    BUN 13 6 - 20 mg/dL    Creatinine 1.23 0.70 - 1.30 mg/dL    BUN/Creatinine

## 2025-06-16 NOTE — ED PROVIDER NOTES
the directions carefully, and ask your doctor or other care provider to review them with you.                  DISCONTINUED MEDICATIONS:  Discharge Medication List as of 6/16/2025  9:32 AM          ED FINAL IMPRESSION     1. Chest pain, unspecified type    2. Anxiety             I am the primary provider of record. Connor Nava DO (electronically signed)    (Please note that parts of this dictation were completed with voice recognition software. Quite often unanticipated grammatical, syntax, homophones, and other interpretive errors are inadvertently transcribed by the computer software. Please disregards these errors. Please excuse any errors that have escaped final proofreading.)       Connor Nava DO  06/16/25 0416

## 2025-06-16 NOTE — ED TRIAGE NOTES
Patient arrived via EMS. Patient states he has a very long cardiac history. Patient was called as a STEMI in the field that was activated here at 0515. Patient states he has been having chest pain for a while and received 324 ASA in route.

## 2025-06-18 ENCOUNTER — APPOINTMENT (OUTPATIENT)
Facility: HOSPITAL | Age: 25
End: 2025-06-18
Payer: MEDICAID

## 2025-06-18 ENCOUNTER — HOSPITAL ENCOUNTER (EMERGENCY)
Facility: HOSPITAL | Age: 25
Discharge: HOME | End: 2025-06-18
Attending: EMERGENCY MEDICINE
Payer: MEDICAID

## 2025-06-18 VITALS
OXYGEN SATURATION: 100 % | WEIGHT: 310 LBS | TEMPERATURE: 99 F | HEART RATE: 78 BPM | RESPIRATION RATE: 20 BRPM | HEIGHT: 72 IN | BODY MASS INDEX: 41.99 KG/M2 | DIASTOLIC BLOOD PRESSURE: 87 MMHG | SYSTOLIC BLOOD PRESSURE: 163 MMHG

## 2025-06-18 DIAGNOSIS — R10.13 ABDOMINAL PAIN, EPIGASTRIC: Primary | ICD-10-CM

## 2025-06-18 LAB
ANION GAP SERPL CALC-SCNC: 8 MMOL/L (ref 2–12)
BASOPHILS # BLD: 0 K/UL (ref 0–0.1)
BASOPHILS NFR BLD: 0 % (ref 0–1)
BUN SERPL-MCNC: 10 MG/DL (ref 6–20)
BUN/CREAT SERPL: 11 (ref 12–20)
CA-I BLD-MCNC: 10.1 MG/DL (ref 8.5–10.1)
CHLORIDE SERPL-SCNC: 109 MMOL/L (ref 97–108)
CO2 SERPL-SCNC: 25 MMOL/L (ref 21–32)
CREAT SERPL-MCNC: 0.94 MG/DL (ref 0.7–1.3)
DIFFERENTIAL METHOD BLD: NORMAL
EOSINOPHIL # BLD: 0.09 K/UL (ref 0–0.4)
EOSINOPHIL NFR BLD: 1 % (ref 0–7)
ERYTHROCYTE [DISTWIDTH] IN BLOOD BY AUTOMATED COUNT: 12.3 % (ref 11.5–14.5)
GLUCOSE SERPL-MCNC: 101 MG/DL (ref 65–100)
HCT VFR BLD AUTO: 39.6 % (ref 36.6–50.3)
HGB BLD-MCNC: 13.8 G/DL (ref 12.1–17)
IMM GRANULOCYTES # BLD AUTO: 0 K/UL
IMM GRANULOCYTES NFR BLD AUTO: 0 %
LIPASE SERPL-CCNC: 16 U/L (ref 13–75)
LYMPHOCYTES # BLD: 2.7 K/UL (ref 0.8–3.5)
LYMPHOCYTES NFR BLD: 30 % (ref 12–49)
MCH RBC QN AUTO: 31.4 PG (ref 26–34)
MCHC RBC AUTO-ENTMCNC: 34.8 G/DL (ref 30–36.5)
MCV RBC AUTO: 90 FL (ref 80–99)
MONOCYTES # BLD: 0.63 K/UL (ref 0–1)
MONOCYTES NFR BLD: 7 % (ref 5–13)
NEUTS SEG # BLD: 5.58 K/UL (ref 1.8–8)
NEUTS SEG NFR BLD: 62 % (ref 32–75)
PLATELET # BLD AUTO: 228 K/UL (ref 150–400)
PMV BLD AUTO: 12.2 FL (ref 8.9–12.9)
POTASSIUM SERPL-SCNC: 3.7 MMOL/L (ref 3.5–5.1)
RBC # BLD AUTO: 4.4 M/UL (ref 4.1–5.7)
RBC MORPH BLD: NORMAL
SODIUM SERPL-SCNC: 142 MMOL/L (ref 136–145)
TROPONIN I SERPL HS-MCNC: 32 NG/L (ref 0–76)
WBC # BLD AUTO: 9 K/UL (ref 4.1–11.1)

## 2025-06-18 PROCEDURE — 99283 EMERGENCY DEPT VISIT LOW MDM: CPT

## 2025-06-18 PROCEDURE — 85025 COMPLETE CBC W/AUTO DIFF WBC: CPT

## 2025-06-18 PROCEDURE — 84484 ASSAY OF TROPONIN QUANT: CPT

## 2025-06-18 PROCEDURE — 6370000000 HC RX 637 (ALT 250 FOR IP): Performed by: EMERGENCY MEDICINE

## 2025-06-18 PROCEDURE — 36415 COLL VENOUS BLD VENIPUNCTURE: CPT

## 2025-06-18 PROCEDURE — 83690 ASSAY OF LIPASE: CPT

## 2025-06-18 PROCEDURE — 80048 BASIC METABOLIC PNL TOTAL CA: CPT

## 2025-06-18 RX ADMIN — LIDOCAINE HYDROCHLORIDE 40 ML: 20 SOLUTION ORAL at 08:11

## 2025-06-18 ASSESSMENT — PAIN - FUNCTIONAL ASSESSMENT: PAIN_FUNCTIONAL_ASSESSMENT: 0-10

## 2025-06-18 ASSESSMENT — PAIN SCALES - GENERAL: PAINLEVEL_OUTOF10: 0

## 2025-06-18 NOTE — ED PROVIDER NOTES
mylanta/viscous lidocaine (GI COCKTAIL) (40 mLs Oral Given 6/18/25 0811)       CONSULTS: See ED Course/MDM for further details.  None   PROCEDURES   Unless otherwise noted above, none  Procedures    SEPSIS REASSESSMENT & CRITICAL CARE TIME   SEPSIS REASSESSMENT: Patient does NOT meet Sepsis criteria after ED workup    Patient does not meet Critical Care Time, 0 minutes  CLINICAL IMPRESSIONS     1. Abdominal pain, epigastric       SDOH/DISPOSITION/PLAN   Social Determinants affecting Treatment Plan: None    DISPOSITION Decision To Discharge 06/18/2025 09:27:38 AM   DISPOSITION CONDITION Stable         Discharge Note: The patient is stable for discharge home. The signs, symptoms, diagnosis, and discharge instructions have been discussed, understanding conveyed, and agreed upon. The patient is to follow up as recommended or return to ER should their symptoms worsen.      PATIENT REFERRED TO:  Marc Mayen APRN - NP    Schedule an appointment as soon as possible for a visit       [facility] Emergency Department  [address] [place] [address]  [phone]    As needed        DISCHARGE MEDICATIONS:     Medication List        ASK your doctor about these medications      albuterol sulfate  (90 Base) MCG/ACT inhaler  Commonly known as: Ventolin HFA  Inhale 2 puffs into the lungs 4 times daily as needed for Wheezing     * famotidine 20 MG tablet  Commonly known as: Pepcid  Take 1 tablet by mouth 2 times daily     * famotidine 20 MG tablet  Commonly known as: PEPCID  Take 1 tablet by mouth 2 times daily     ibuprofen 600 MG tablet  Commonly known as: ADVIL;MOTRIN  Take 1 tablet by mouth 3 times daily as needed for Pain     ketorolac 10 MG tablet  Commonly known as: TORADOL  Take 1 tablet by mouth every 6 hours as needed for Pain     methocarbamol 750 MG tablet  Commonly known as: Robaxin-750  Take 1 tablet by mouth 4 times daily as needed (muscle pain)     omeprazole 20 MG

## 2025-06-18 NOTE — DISCHARGE INSTRUCTIONS
Thank you for choosing our Emergency Department for your care.  It is our privilege to care for you in your time of need.  In the next several days, you may receive a survey via email or mailed to your home about your experience with our team.  We would greatly appreciate you taking a few minutes to complete the survey, as we use this information to learn what we have done well and what we could be doing better. Thank you for trusting us with your care!    Below you will find a list of your tests from today's visit.   Labs and Radiology Studies  Recent Results (from the past 12 hours)   BMP    Collection Time: 06/18/25  7:38 AM   Result Value Ref Range    Sodium 142 136 - 145 mmol/L    Potassium 3.7 3.5 - 5.1 mmol/L    Chloride 109 (H) 97 - 108 mmol/L    CO2 25 21 - 32 mmol/L    Anion Gap 8 2 - 12 mmol/L    Glucose 101 (H) 65 - 100 mg/dL    BUN 10 6 - 20 mg/dL    Creatinine 0.94 0.70 - 1.30 mg/dL    BUN/Creatinine Ratio 11 (L) 12 - 20      Est, Glom Filt Rate >90 >60 ml/min/1.73m2    Calcium 10.1 8.5 - 10.1 mg/dL   Lipase    Collection Time: 06/18/25  7:38 AM   Result Value Ref Range    Lipase 16 13 - 75 U/L   Troponin    Collection Time: 06/18/25  7:38 AM   Result Value Ref Range    Troponin, High Sensitivity 32 0 - 76 ng/L     No results found.  ------------------------------------------------------------------------------------------------------------  The evaluation and treatment you received in the Emergency Department were for an urgent problem. It is important that you follow-up with a doctor, nurse practitioner, or physician assistant to:  (1) confirm your diagnosis,  (2) re-evaluation of changes in your illness and treatment, and (3) for ongoing care. Please take your discharge instructions with you when you go to your follow-up appointment.     If you have any problem arranging a follow-up appointment, contact us!  If your symptoms become worse or you do not improve as expected, please return to us. We

## 2025-06-18 NOTE — ED TRIAGE NOTES
Pt states he ate a banana last night and then started with abdominal pain, itching and chills. Pt denies food allergies.

## 2025-06-28 ENCOUNTER — APPOINTMENT (OUTPATIENT)
Facility: HOSPITAL | Age: 25
End: 2025-06-28
Payer: MEDICAID

## 2025-06-28 ENCOUNTER — HOSPITAL ENCOUNTER (EMERGENCY)
Facility: HOSPITAL | Age: 25
Discharge: HOME | End: 2025-06-28
Payer: MEDICAID

## 2025-06-28 VITALS
SYSTOLIC BLOOD PRESSURE: 148 MMHG | HEIGHT: 73 IN | DIASTOLIC BLOOD PRESSURE: 72 MMHG | TEMPERATURE: 98.2 F | OXYGEN SATURATION: 98 % | WEIGHT: 290 LBS | HEART RATE: 71 BPM | RESPIRATION RATE: 16 BRPM | BODY MASS INDEX: 38.43 KG/M2

## 2025-06-28 DIAGNOSIS — R07.89 ATYPICAL CHEST PAIN: Primary | ICD-10-CM

## 2025-06-28 LAB
ALBUMIN SERPL-MCNC: 4.2 G/DL (ref 3.5–5)
ALBUMIN/GLOB SERPL: 1.2 (ref 1.1–2.2)
ALP SERPL-CCNC: 58 U/L (ref 45–117)
ALT SERPL-CCNC: 30 U/L (ref 12–78)
ANION GAP SERPL CALC-SCNC: 12 MMOL/L (ref 2–12)
APPEARANCE UR: CLEAR
AST SERPL W P-5'-P-CCNC: 20 U/L (ref 15–37)
BACTERIA URNS QL MICRO: NEGATIVE /HPF
BASOPHILS # BLD: 0.04 K/UL (ref 0–0.1)
BASOPHILS NFR BLD: 0.5 % (ref 0–1)
BILIRUB SERPL-MCNC: 0.5 MG/DL (ref 0.2–1)
BILIRUB UR QL CFM: POSITIVE
BILIRUB UR QL: ABNORMAL
BUN SERPL-MCNC: 10 MG/DL (ref 6–20)
BUN/CREAT SERPL: 10 (ref 12–20)
CA-I BLD-MCNC: 10.1 MG/DL (ref 8.5–10.1)
CHLORIDE SERPL-SCNC: 105 MMOL/L (ref 97–108)
CO2 SERPL-SCNC: 26 MMOL/L (ref 21–32)
COLOR UR: YELLOW
CREAT SERPL-MCNC: 0.98 MG/DL (ref 0.7–1.3)
DIFFERENTIAL METHOD BLD: NORMAL
EOSINOPHIL # BLD: 0.03 K/UL (ref 0–0.4)
EOSINOPHIL NFR BLD: 0.4 % (ref 0–7)
EPITH CASTS URNS QL MICRO: ABNORMAL /LPF
ERYTHROCYTE [DISTWIDTH] IN BLOOD BY AUTOMATED COUNT: 12.3 % (ref 11.5–14.5)
FLUAV RNA SPEC QL NAA+PROBE: NOT DETECTED
FLUBV RNA SPEC QL NAA+PROBE: NOT DETECTED
GLOBULIN SER CALC-MCNC: 3.5 G/DL (ref 2–4)
GLUCOSE SERPL-MCNC: 88 MG/DL (ref 65–100)
GLUCOSE UR STRIP.AUTO-MCNC: NEGATIVE MG/DL
HCT VFR BLD AUTO: 41.4 % (ref 36.6–50.3)
HGB BLD-MCNC: 14 G/DL (ref 12.1–17)
HGB UR QL STRIP: NEGATIVE
IMM GRANULOCYTES # BLD AUTO: 0.01 K/UL (ref 0–0.04)
IMM GRANULOCYTES NFR BLD AUTO: 0.1 % (ref 0–0.5)
KETONES UR QL STRIP.AUTO: 15 MG/DL
LEUKOCYTE ESTERASE UR QL STRIP.AUTO: NEGATIVE
LYMPHOCYTES # BLD: 2.2 K/UL (ref 0.8–3.5)
LYMPHOCYTES NFR BLD: 25.9 % (ref 12–49)
MCH RBC QN AUTO: 31.1 PG (ref 26–34)
MCHC RBC AUTO-ENTMCNC: 33.8 G/DL (ref 30–36.5)
MCV RBC AUTO: 92 FL (ref 80–99)
MONOCYTES # BLD: 0.5 K/UL (ref 0–1)
MONOCYTES NFR BLD: 5.9 % (ref 5–13)
NEUTS SEG # BLD: 5.72 K/UL (ref 1.8–8)
NEUTS SEG NFR BLD: 67.2 % (ref 32–75)
NITRITE UR QL STRIP.AUTO: NEGATIVE
PH UR STRIP: 5 (ref 5–8)
PLATELET # BLD AUTO: 223 K/UL (ref 150–400)
PMV BLD AUTO: 12.5 FL (ref 8.9–12.9)
POTASSIUM SERPL-SCNC: 3.8 MMOL/L (ref 3.5–5.1)
PROT SERPL-MCNC: 7.7 G/DL (ref 6.4–8.2)
PROT UR STRIP-MCNC: ABNORMAL MG/DL
RBC # BLD AUTO: 4.5 M/UL (ref 4.1–5.7)
RBC #/AREA URNS HPF: ABNORMAL /HPF (ref 0–5)
SARS-COV-2 RNA RESP QL NAA+PROBE: NOT DETECTED
SODIUM SERPL-SCNC: 143 MMOL/L (ref 136–145)
SP GR UR REFRACTOMETRY: 1.02 (ref 1–1.03)
TROPONIN I SERPL HS-MCNC: 31 NG/L (ref 0–76)
URINE CULTURE IF INDICATED: ABNORMAL
UROBILINOGEN UR QL STRIP.AUTO: 0.1 EU/DL (ref 0.2–1)
WBC # BLD AUTO: 8.5 K/UL (ref 4.1–11.1)
WBC URNS QL MICRO: ABNORMAL /HPF (ref 0–4)

## 2025-06-28 PROCEDURE — 84484 ASSAY OF TROPONIN QUANT: CPT

## 2025-06-28 PROCEDURE — 81001 URINALYSIS AUTO W/SCOPE: CPT

## 2025-06-28 PROCEDURE — 99285 EMERGENCY DEPT VISIT HI MDM: CPT

## 2025-06-28 PROCEDURE — 80053 COMPREHEN METABOLIC PANEL: CPT

## 2025-06-28 PROCEDURE — 71046 X-RAY EXAM CHEST 2 VIEWS: CPT

## 2025-06-28 PROCEDURE — 93005 ELECTROCARDIOGRAM TRACING: CPT

## 2025-06-28 PROCEDURE — 87636 SARSCOV2 & INF A&B AMP PRB: CPT

## 2025-06-28 PROCEDURE — 6370000000 HC RX 637 (ALT 250 FOR IP)

## 2025-06-28 PROCEDURE — 85025 COMPLETE CBC W/AUTO DIFF WBC: CPT

## 2025-06-28 RX ORDER — ACETAMINOPHEN 500 MG
1000 TABLET ORAL
Status: COMPLETED | OUTPATIENT
Start: 2025-06-28 | End: 2025-06-28

## 2025-06-28 RX ADMIN — ACETAMINOPHEN 1000 MG: 500 TABLET ORAL at 15:51

## 2025-06-28 ASSESSMENT — HEART SCORE: ECG: NORMAL

## 2025-06-28 NOTE — ED TRIAGE NOTES
Pt states he has had pain all over for a couple weeks, denies any injury. Pt states it hurts the most in his back and neck. Denies any n/v/d or weakness. Has not tried any pain medication

## 2025-06-28 NOTE — ED PROVIDER NOTES
[facility] EMERGENCY DEPT  EMERGENCY DEPARTMENT HISTORY AND PHYSICAL EXAM      Date of evaluation: 6/28/2025  Patient Name: Joao Bell Jr.  Birthdate 2000  MRN: [id number]  ED Provider: PAULINO Whitfield   Note Started: 4:21 PM EDT 6/28/25    HISTORY OF PRESENT ILLNESS     Chief Complaint   Patient presents with    Neck Pain    Back Pain    Flank Pain       History Provided By: Patient, only     HPI: Joao Bell Jr. is a 24 y.o. male with past medical history as listed and reviewed below who presents to the ED complaining of generalized body pain x 3 to 4 weeks. He initially complained of neck, back, and flank pain, however then reported chest pain and some shortness of breath which comes and goes without obvious aggravating/alleviating factors. He saw a cardiologist yesterday who reportedly suspected his recent symptoms are due to GERD; he has also been seen several times in the ED for these symptoms with largely reassuring work ups. He denies any fever/chills, abdominal pain, or nausea/vomiting/diarrhea currently.  He did not take anything OTC for pain today.    PAST MEDICAL HISTORY   Past Medical History:  Past Medical History:   Diagnosis Date    Anxiety     GERD (gastroesophageal reflux disease)     Psychiatric disorder     Anxiety, Depression       Past Surgical History:  No past surgical history on file.    Family History:  No family history on file.    Social History:  Social History     Tobacco Use    Smoking status: Never    Smokeless tobacco: Never   Substance Use Topics    Alcohol use: Never    Drug use: Never       Allergies:  No Known Allergies    PCP: No, Pcp    Current Meds:   No current facility-administered medications for this encounter.     Current Outpatient Medications   Medication Sig Dispense Refill    famotidine (PEPCID) 20 MG tablet Take 1 tablet by mouth 2 times daily 60 tablet 0    omeprazole (PRILOSEC) 20 MG delayed release capsule Take 1 capsule by mouth 2 times daily (before

## 2025-06-28 NOTE — DISCHARGE INSTRUCTIONS
Thank you for choosing our Emergency Department for your care.  It is our privilege to care for you in your time of need.  In the next several days, you may receive a survey via email or mailed to your home about your experience with our team.  We would greatly appreciate you taking a few minutes to complete the survey, as we use this information to learn what we have done well and what we could be doing better. Thank you for trusting us with your care!    Below you will find a list of your tests from today's visit.   Labs and Radiology Studies  Recent Results (from the past 12 hours)   Urinalysis with Reflex to Culture    Collection Time: 06/28/25  3:30 PM    Specimen: Urine   Result Value Ref Range    Color, UA Yellow      Appearance Clear Clear      Specific Gravity, UA 1.025 1.003 - 1.030      pH, Urine 5.0 5.0 - 8.0      Protein, UA Trace (A) Negative mg/dL    Glucose, Ur Negative Negative mg/dL    Ketones, Urine 15 (A) Negative mg/dL    Bilirubin, Urine Small (A) Negative      Blood, Urine Negative Negative      Urobilinogen, Urine 0.1 (L) 0.2 - 1.0 EU/dL    Nitrite, Urine Negative Negative      Leukocyte Esterase, Urine Negative Negative      Bilirubin Confirmation, UA Positive (A) Negative      WBC, UA 0-4 0 - 4 /hpf    RBC, UA 0-5 0 - 5 /hpf    Epithelial Cells, UA Few Few /lpf    BACTERIA, URINE Negative Negative /hpf    Urine Culture if Indicated Culture not indicated by UA result Culture not indicated by UA result     CBC with Auto Differential    Collection Time: 06/28/25  4:15 PM   Result Value Ref Range    WBC 8.5 4.1 - 11.1 K/uL    RBC 4.50 4.10 - 5.70 M/uL    Hemoglobin 14.0 12.1 - 17.0 g/dL    Hematocrit 41.4 36.6 - 50.3 %    MCV 92.0 80.0 - 99.0 FL    MCH 31.1 26.0 - 34.0 PG    MCHC 33.8 30.0 - 36.5 g/dL    RDW 12.3 11.5 - 14.5 %    Platelets 223 150 - 400 K/uL    MPV 12.5 8.9 - 12.9 FL    Neutrophils % 67.2 32.0 - 75.0 %    Lymphocytes % 25.9 12.0 - 49.0 %    Monocytes % 5.9 5.0 - 13.0 %

## 2025-06-29 LAB
EKG ATRIAL RATE: 70 BPM
EKG DIAGNOSIS: NORMAL
EKG P AXIS: 4 DEGREES
EKG P-R INTERVAL: 140 MS
EKG Q-T INTERVAL: 434 MS
EKG QRS DURATION: 94 MS
EKG QTC CALCULATION (BAZETT): 468 MS
EKG R AXIS: 40 DEGREES
EKG T AXIS: 212 DEGREES
EKG VENTRICULAR RATE: 70 BPM